# Patient Record
Sex: MALE | Race: BLACK OR AFRICAN AMERICAN | NOT HISPANIC OR LATINO | Employment: FULL TIME | URBAN - METROPOLITAN AREA
[De-identification: names, ages, dates, MRNs, and addresses within clinical notes are randomized per-mention and may not be internally consistent; named-entity substitution may affect disease eponyms.]

---

## 2023-05-04 ENCOUNTER — OFFICE VISIT (OUTPATIENT)
Dept: PRIMARY CARE | Facility: CLINIC | Age: 33
End: 2023-05-04
Payer: COMMERCIAL

## 2023-05-04 VITALS
HEIGHT: 72 IN | SYSTOLIC BLOOD PRESSURE: 133 MMHG | WEIGHT: 216 LBS | DIASTOLIC BLOOD PRESSURE: 75 MMHG | HEART RATE: 90 BPM | BODY MASS INDEX: 29.26 KG/M2

## 2023-05-04 DIAGNOSIS — M16.10 ARTHRITIS, HIP: ICD-10-CM

## 2023-05-04 DIAGNOSIS — B95.8 STAPH SKIN INFECTION: ICD-10-CM

## 2023-05-04 DIAGNOSIS — R74.01 TRANSAMINITIS: Primary | ICD-10-CM

## 2023-05-04 DIAGNOSIS — L08.9 STAPH SKIN INFECTION: ICD-10-CM

## 2023-05-04 DIAGNOSIS — I31.39 PERICARDIAL EFFUSION (HHS-HCC): ICD-10-CM

## 2023-05-04 DIAGNOSIS — K86.89 FATTY PANCREAS (HHS-HCC): ICD-10-CM

## 2023-05-04 LAB
C REACTIVE PROTEIN (MG/L) IN SER/PLAS: 0.51 MG/DL
ERYTHROCYTE DISTRIBUTION WIDTH (RATIO) BY AUTOMATED COUNT: 13.9 % (ref 11.5–14.5)
ERYTHROCYTE MEAN CORPUSCULAR HEMOGLOBIN CONCENTRATION (G/DL) BY AUTOMATED: 30.9 G/DL (ref 32–36)
ERYTHROCYTE MEAN CORPUSCULAR VOLUME (FL) BY AUTOMATED COUNT: 89 FL (ref 80–100)
ERYTHROCYTES (10*6/UL) IN BLOOD BY AUTOMATED COUNT: 4.42 X10E12/L (ref 4.5–5.9)
ESTIMATED AVERAGE GLUCOSE FOR HBA1C: 111 MG/DL
HEMATOCRIT (%) IN BLOOD BY AUTOMATED COUNT: 39.2 % (ref 41–52)
HEMOGLOBIN (G/DL) IN BLOOD: 12.1 G/DL (ref 13.5–17.5)
HEMOGLOBIN A1C/HEMOGLOBIN TOTAL IN BLOOD: 5.5 %
LEUKOCYTES (10*3/UL) IN BLOOD BY AUTOMATED COUNT: 7.2 X10E9/L (ref 4.4–11.3)
NRBC (PER 100 WBCS) BY AUTOMATED COUNT: 0 /100 WBC (ref 0–0)
PLATELETS (10*3/UL) IN BLOOD AUTOMATED COUNT: 390 X10E9/L (ref 150–450)

## 2023-05-04 PROCEDURE — 99204 OFFICE O/P NEW MOD 45 MIN: CPT | Performed by: INTERNAL MEDICINE

## 2023-05-04 PROCEDURE — 82150 ASSAY OF AMYLASE: CPT

## 2023-05-04 PROCEDURE — 80061 LIPID PANEL: CPT

## 2023-05-04 PROCEDURE — 85027 COMPLETE CBC AUTOMATED: CPT

## 2023-05-04 PROCEDURE — 83690 ASSAY OF LIPASE: CPT

## 2023-05-04 PROCEDURE — 80076 HEPATIC FUNCTION PANEL: CPT

## 2023-05-04 PROCEDURE — 86140 C-REACTIVE PROTEIN: CPT

## 2023-05-04 PROCEDURE — 83036 HEMOGLOBIN GLYCOSYLATED A1C: CPT

## 2023-05-04 RX ORDER — SULFAMETHOXAZOLE AND TRIMETHOPRIM 800; 160 MG/1; MG/1
1 TABLET ORAL 2 TIMES DAILY
COMMUNITY
Start: 2023-04-26

## 2023-05-04 RX ORDER — CEPHALEXIN 500 MG/1
500 CAPSULE ORAL EVERY 6 HOURS
COMMUNITY
Start: 2023-04-26

## 2023-05-04 ASSESSMENT — PATIENT HEALTH QUESTIONNAIRE - PHQ9
1. LITTLE INTEREST OR PLEASURE IN DOING THINGS: NOT AT ALL
SUM OF ALL RESPONSES TO PHQ9 QUESTIONS 1 AND 2: 0
2. FEELING DOWN, DEPRESSED OR HOPELESS: NOT AT ALL

## 2023-05-04 NOTE — ASSESSMENT & PLAN NOTE
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  We will recheck the liver functions at this time.  Patient denies excessive use of alcohol.  Recommend regular exercise.  Check amylase lipase and A1c  Follow-up 1 month

## 2023-05-04 NOTE — ASSESSMENT & PLAN NOTE
Unclear etiology but given the recent diagnosis of staph infection would be concerned for infection.  Clinically patient appears non-ill however will repeat the CBC to ensure the white count downtrends  Check CRP and order stat echocardiogram given pericardial effusion and pericardial lymph node.  No audible murmurs on physical findings

## 2023-05-04 NOTE — ASSESSMENT & PLAN NOTE
Given evidence of severe osteoarthritis in an individual who is under the age of 35 will refer to orthopedist for evaluation

## 2023-05-04 NOTE — ASSESSMENT & PLAN NOTE
Clinically the skin infection appears to be improving and resolving.  Continue the antibiotics to completion

## 2023-05-04 NOTE — PROGRESS NOTES
Subjective   Patient ID: Christiano Gasca is a 32 y.o. male who presents for New Patient Visit (Nov/Follow up to the E.R).    HPI     Patient is a 32-year-old male who presents as follow-up from recent ER visit.  Patient has no significant past medical history.  He went to the emergency room due to an infection of his left upper extremity after having a tattoo placed.  He was diagnosed with a staph infection of the arm and was placed on both Keflex and Bactrim.  He has completed the Bactrim but he still on the Keflex.  For the most part the infection has cleared.  Of note during the ER visit his white count was noted to be 16 and he had an elevated liver function panel as well as an alk phos.  A CT scan of the abdomen and pelvis did not show any evidence of biliary disease however it did show fatty liver as well as fatty pancreas with concerns for changes that might suggest either diabetes or pancreatic insufficiency.  Patient denies any floating stools or excessive diarrhea.  He denies history of pancreatitis or diabetes.    The CT chest showed evidence of a pericardial effusion as well as a pericardial lymph node.  It is unclear what the clinical significance is.  Clinically patient is improving and he denies any fevers or fatigue.  No swelling of the legs and no shortness of breath.    Patient also complaining of right knee and hip pain.  He states this been going on for a while.  The CT scan in the ER showed severe arthritis of the right hip which patient was not aware of.  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        Review of Systems  Constitutional: No fever or chills  Cardiovascular: no chest pain, no palpitations and no syncope.   Respiratory: no cough, no shortness of breath during exertion and no shortness of breath at rest.   Gastrointestinal: no abdominal pain, no nausea and no vomiting.  Neuro: No Headache, no dizziness    Objective   /75   Pulse 90    "Ht 1.816 m (5' 11.5\")   Wt 98 kg (216 lb)   BMI 29.71 kg/m²     Physical Exam  Constitutional: Alert and in no acute distress. Well developed, well nourished  Head and Face: Head and face: Normal.    Cardiovascular: Heart rate and rhythm were normal, normal S1 and S2. No peripheral edema.   Pulmonary: No respiratory distress. Clear bilateral breath sounds.  Musculoskeletal: Gait and station: Normal. Muscle strength/tone: Normal.   Skin: Normal skin color and pigmentation, normal skin turgor, and no rash.    Psychiatric: Judgment and insight: Intact. Mood and affect: Normal.        Lab Results   Component Value Date    WBC 16.8 (H) 04/26/2023    HGB 14.4 04/26/2023    HCT 42.2 04/26/2023     04/26/2023     (H) 04/26/2023    AST 42 (H) 04/26/2023     04/26/2023    K 3.2 (L) 04/26/2023    CL 99 04/26/2023    CREATININE 0.76 04/26/2023    BUN 17 04/26/2023    CO2 30 04/26/2023       CT abdomen pelvis w IV contrast  Narrative: Interpreted By:  EUNICE GAMEZ MD and MARLON NORIEGA MD  MRN: [id number]  Patient Name: SALVADOR HEMPHILL     STUDY:  CT ABDOMEN AND PELVIS W IV CONTRAST;  4/26/2023 4:06 pm     INDICATION:  fever, Lie Flat: Yes .     COMPARISON:  None.     ACCESSION NUMBER(S):  [id number]     ORDERING CLINICIAN:  ARMAAN DIETRICH     TECHNIQUE:  CT of the abdomen and pelvis was performed.  Standard contiguous  axial images were obtained at 3 mm slice thickness through the  abdomen and pelvis. Coronal and sagittal reconstructions at 3 mm  slice thickness were performed.     85 ml of contrast Omnipaque 350 were administered intravenously  without immediate complication.     FINDINGS:  LOWER CHEST:  The visualized lung base is unremarkable. The heart is normal in size  with trace pericardial effusion and mild pericardial enhancement best  visualized on series 301, image 1) there is a small pericardial lymph  node noted on series 301, image 22). No pleural effusion is present.  Visualized distal " esophagus appears normal.     ABDOMEN:     LIVER:  The liver is normal in size. There is a 1.1 cm hypodense,  hypoattenuating liver lesion visualized on series 301, image 47 which  is too small to characterize.     BILE DUCTS:  The intrahepatic and extrahepatic ducts are not dilated.     GALLBLADDER:  No calcified stones. No wall thickening.     PANCREAS:  There is fatty replacement of the pancreatic parenchyma with no  pancreatic ductal dilatation or masses.     SPLEEN:  The spleen is normal in size without focal lesions.     ADRENAL GLANDS:  Bilateral adrenal glands appear normal.     KIDNEYS AND URETERS:  The kidneys are normal in size and enhance symmetrically.  No  hydroureteronephrosis or nephroureterolithiasis is identified.     PELVIS:     BLADDER:  The urinary bladder appears normal without abnormal wall thickening.     REPRODUCTIVE ORGANS:  The prostate is not enlarged.     BOWEL:  The stomach is unremarkable.   The small and large bowel are normal  in caliber and demonstrate no wall thickening.   Normal appendix.     VESSELS:  There is no aneurysmal dilatation of the abdominal aorta. The IVC  appears normal.     PERITONEUM/RETROPERITONEUM/LYMPH NODES:  No ascites or free air, no fluid collection.  No enlarged mesenteric  lymph nodes.     BONES AND ABDOMINAL WALL:  There are multiple focal lucencies, and sclerosis involving the right  femoral head and acetabulum best visualized on coronal view series  303, image 53 through 67 with osteophytosis and narrowing of the  anterior superior aspect of the hip joint space likely to suggest  severe osteoarthrosis. Suspicious osseous lesions are identified.  The abdominal wall soft tissues appear normal.     Impression: 1.  Trace pericardial fluid and mild enhancement with a small  subcentimeter pericardial lymph node as detailed above. Findings may  represent pericarditis recommend correlation with clinical evaluation  an echocardiogram.  2. Fatty infiltration of  the pancreatic parenchyma with no pancreatic  ductal dilatation or masses. Findings to be correlated with history  of pancreatic insufficiency and diabetes mellitus.  3. Severe right hip osteoarthritis with narrowing of the anterior  superior hip joint space may be secondary to prior trauma or  femoroacetabular impingement.     I personally reviewed the images/study and I agree with the findings  as stated. This study was interpreted at [place], [place].  XR chest 2 view  Narrative: Interpreted By:  LETY AAVLOS MD and EDENILSON POLO MD  MRN: [id number]  Patient Name: SALVADOR HEMPHILL     STUDY:   CHEST 2 VIEW PA AND LAT;  4/26/2023 3:51 pm     INDICATION:  fever .     COMPARISON:  Chest x-ray 09/12/2021.     ACCESSION NUMBER(S):  [id number]     ORDERING CLINICIAN:  ARMAAN DIETRICH     FINDINGS:  PA and lateral radiographs of the chest were provided.     CARDIOMEDIASTINAL SILHOUETTE:  Cardiomediastinal silhouette is normal in size and configuration.     LUNGS:  No focal consolidation, sizeable pleural effusion or pneumothorax.     ABDOMEN:  No remarkable upper abdominal findings.     BONES:  No acute osseous changes.     Impression: 1.  No evidence of acute cardiopulmonary process.     I personally reviewed the images/study and I agree with the findings  as stated. This study was interpreted at [facility], [place], [place].            Assessment/Plan   Problem List Items Addressed This Visit          Circulatory    Pericardial effusion     Unclear etiology but given the recent diagnosis of staph infection would be concerned for infection.  Clinically patient appears non-ill however will repeat the CBC to ensure the white count downtrends  Check CRP and order stat echocardiogram given pericardial effusion and pericardial lymph node.  No audible murmurs on physical findings         Relevant Orders    Lipase    Amylase    Hemoglobin  A1C    Lipid Panel    Transthoracic Echo (TTE) Complete    C-reactive protein       Digestive    Fatty pancreas    Relevant Orders    Lipase    Amylase    Hemoglobin A1C    Lipid Panel    C-reactive protein       Musculoskeletal    Arthritis, hip     Given evidence of severe osteoarthritis in an individual who is under the age of 35 will refer to orthopedist for evaluation         Relevant Orders    Referral to Orthopaedic Surgery    Lipase    Amylase    Hemoglobin A1C    Lipid Panel    C-reactive protein       Infectious/Inflammatory    Staph skin infection     Clinically the skin infection appears to be improving and resolving.  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  We will recheck the liver functions at this time.  Patient denies excessive use of alcohol.  Recommend regular exercise.  Check amylase lipase and A1c  Follow-up 1 month         Relevant Orders    Hepatic function panel    CBC    Lipase    Amylase    Hemoglobin A1C    Lipid Panel    C-reactive protein

## 2023-05-05 LAB
ALANINE AMINOTRANSFERASE (SGPT) (U/L) IN SER/PLAS: 152 U/L (ref 10–52)
ALBUMIN (G/DL) IN SER/PLAS: 4.4 G/DL (ref 3.4–5)
ALKALINE PHOSPHATASE (U/L) IN SER/PLAS: 70 U/L (ref 33–120)
AMYLASE (U/L) IN SER/PLAS: 37 U/L (ref 29–103)
ASPARTATE AMINOTRANSFERASE (SGOT) (U/L) IN SER/PLAS: 57 U/L (ref 9–39)
BILIRUBIN DIRECT (MG/DL) IN SER/PLAS: 0.2 MG/DL (ref 0–0.3)
BILIRUBIN TOTAL (MG/DL) IN SER/PLAS: 0.6 MG/DL (ref 0–1.2)
CHOLESTEROL (MG/DL) IN SER/PLAS: 109 MG/DL (ref 0–199)
CHOLESTEROL IN HDL (MG/DL) IN SER/PLAS: 46.4 MG/DL
CHOLESTEROL/HDL RATIO: 2.3
LDL: 53 MG/DL (ref 0–99)
LIPASE (U/L) IN SER/PLAS: 30 U/L (ref 9–82)
PROTEIN TOTAL: 6.6 G/DL (ref 6.4–8.2)
TRIGLYCERIDE (MG/DL) IN SER/PLAS: 47 MG/DL (ref 0–149)
VLDL: 9 MG/DL (ref 0–40)

## 2023-05-17 DIAGNOSIS — R74.01 TRANSAMINITIS: Primary | ICD-10-CM

## 2023-10-06 RX ORDER — MELOXICAM 15 MG/1
15 TABLET ORAL DAILY
COMMUNITY
Start: 2023-05-16

## 2023-10-24 ENCOUNTER — APPOINTMENT (OUTPATIENT)
Dept: PHYSICAL THERAPY | Facility: CLINIC | Age: 33
End: 2023-10-24
Payer: COMMERCIAL

## 2024-05-02 ENCOUNTER — APPOINTMENT (OUTPATIENT)
Dept: RADIOLOGY | Facility: CLINIC | Age: 34
End: 2024-05-02
Payer: COMMERCIAL

## 2024-05-16 ENCOUNTER — APPOINTMENT (OUTPATIENT)
Dept: PRIMARY CARE | Facility: CLINIC | Age: 34
End: 2024-05-16
Payer: COMMERCIAL

## 2024-05-30 ENCOUNTER — APPOINTMENT (OUTPATIENT)
Dept: PRIMARY CARE | Facility: CLINIC | Age: 34
End: 2024-05-30
Payer: COMMERCIAL

## 2024-07-02 ENCOUNTER — APPOINTMENT (OUTPATIENT)
Dept: ORTHOPEDIC SURGERY | Facility: CLINIC | Age: 34
End: 2024-07-02
Payer: COMMERCIAL

## 2024-07-08 ENCOUNTER — APPOINTMENT (OUTPATIENT)
Dept: ORTHOPEDIC SURGERY | Facility: CLINIC | Age: 34
End: 2024-07-08
Payer: COMMERCIAL

## 2024-07-08 DIAGNOSIS — M25.561 RIGHT KNEE PAIN, UNSPECIFIED CHRONICITY: ICD-10-CM

## 2024-07-11 ENCOUNTER — APPOINTMENT (OUTPATIENT)
Dept: PRIMARY CARE | Facility: CLINIC | Age: 34
End: 2024-07-11
Payer: COMMERCIAL

## 2024-07-18 ENCOUNTER — APPOINTMENT (OUTPATIENT)
Dept: ORTHOPEDIC SURGERY | Facility: CLINIC | Age: 34
End: 2024-07-18
Payer: COMMERCIAL

## 2024-08-06 ENCOUNTER — OFFICE VISIT (OUTPATIENT)
Dept: ORTHOPEDIC SURGERY | Facility: CLINIC | Age: 34
End: 2024-08-06
Payer: COMMERCIAL

## 2024-08-06 ENCOUNTER — HOSPITAL ENCOUNTER (OUTPATIENT)
Dept: RADIOLOGY | Facility: CLINIC | Age: 34
Discharge: HOME | End: 2024-08-06
Payer: COMMERCIAL

## 2024-08-06 VITALS — WEIGHT: 216 LBS | BODY MASS INDEX: 30.24 KG/M2 | HEIGHT: 71 IN

## 2024-08-06 DIAGNOSIS — M25.561 RIGHT KNEE PAIN, UNSPECIFIED CHRONICITY: ICD-10-CM

## 2024-08-06 DIAGNOSIS — M16.10 HIP ARTHRITIS: Primary | ICD-10-CM

## 2024-08-06 PROCEDURE — 73562 X-RAY EXAM OF KNEE 3: CPT | Mod: RIGHT SIDE | Performed by: RADIOLOGY

## 2024-08-06 PROCEDURE — 3008F BODY MASS INDEX DOCD: CPT | Performed by: ORTHOPAEDIC SURGERY

## 2024-08-06 PROCEDURE — 99214 OFFICE O/P EST MOD 30 MIN: CPT | Mod: 57 | Performed by: ORTHOPAEDIC SURGERY

## 2024-08-06 PROCEDURE — 73562 X-RAY EXAM OF KNEE 3: CPT | Mod: RT

## 2024-08-06 PROCEDURE — 99214 OFFICE O/P EST MOD 30 MIN: CPT | Performed by: ORTHOPAEDIC SURGERY

## 2024-08-06 RX ORDER — SODIUM CHLORIDE, SODIUM LACTATE, POTASSIUM CHLORIDE, CALCIUM CHLORIDE 600; 310; 30; 20 MG/100ML; MG/100ML; MG/100ML; MG/100ML
100 INJECTION, SOLUTION INTRAVENOUS CONTINUOUS
OUTPATIENT
Start: 2024-08-06

## 2024-08-06 ASSESSMENT — PAIN DESCRIPTION - DESCRIPTORS: DESCRIPTORS: ACHING;THROBBING;SHARP

## 2024-08-06 ASSESSMENT — PAIN - FUNCTIONAL ASSESSMENT: PAIN_FUNCTIONAL_ASSESSMENT: 0-10

## 2024-08-06 ASSESSMENT — PAIN SCALES - GENERAL: PAINLEVEL_OUTOF10: 8

## 2024-08-06 NOTE — LETTER
August 6, 2024     Patient: Christiano Gasca   YOB: 1990   Date of Visit: 8/6/2024       To Whom It May Concern:    It is my medical opinion that Christiano Gasca  may return to work with the following restrictions: no jumping off truck .    If you have any questions or concerns, please don't hesitate to call. [phone].         Sincerely,      CUAUHTEMOC López MD    CC: No Recipients

## 2024-08-06 NOTE — LETTER
August 6, 2024     Patient: Christiano Gasca   YOB: 1990   Date of Visit: 8/6/2024       To Whom It May Concern:    It is my medical opinion that Christiano Gasca may return to light duty immediately with the following restrictions: no driving or jumping off truck  .    If you have any questions or concerns, please don't hesitate to call. [phone].         Sincerely,        MD Elise Jane LPN  CC: No Recipients

## 2024-08-06 NOTE — PROGRESS NOTES
Chief complaint is right hip pain he is struggling this week sometimes can even get out of bed in the morning he works on an armored car he has tried activity modification anti-inflammatories and physical therapy he suffered from the sequela of a childhood hip trauma sounds like a hip dislocation  He is also having some pain rating to the knee  Past medical,family and social histories have been reviewed and are up to date.  All other body systems have been reviewed and are negative for complaint.  Constitutional: Well-developed well-nourished   Eyes: Sclerae anicteric, pupils equal and round  HENT: Normocephalic atraumatic  Cardiovascular: Pulses full, regular rate and rhythm  Respiratory: Breathing not labored, no wheezing  Integumentary: Skin intact, no lesions or rashes  Neurological: Sensation intact, no gross strength deficits, reflexes equal  Psychiatric: Alert oriented and appropriate  Hematologic/lymphatic: No lymphadenopathy  Right hip 0 rotation very painful with any attempted manipulation  Right knee there is no effusion full range of motion no instability  X-rays of the hip show very advanced end-stage arthritis with cystic changes and severe joint space narrowing x-rays of the knee are negative assessment end-stage hip arthritis we discussed hip replacement in detail even though he is quite young to have this done he has such end-stage arthritis there is no other solution he understands to require revision surgery at some point in his life  This patient has failed nonoperative treatment for hip arthritis which has included activity modification attempts at weight loss physical therapy and oral anti-inflammatory medication.  We have discussed the potential risks of surgery including but not limited to leg length inequality, infection, DVT, neurovascular injury persistent pain, prosthetic loosening and periprosthetic fracture.  The patient wishes to proceed with surgery  Patient understands the  preoperative medical evaluation and joint replacement class will be necessary.  The patient also understands that the plan is this for her to be an outpatient procedure and that appropriate arrangements must be made for supervision and assistance at home.

## 2024-08-07 ENCOUNTER — TELEPHONE (OUTPATIENT)
Dept: ORTHOPEDIC SURGERY | Facility: CLINIC | Age: 34
End: 2024-08-07
Payer: COMMERCIAL

## 2024-08-08 DIAGNOSIS — Z01.818 PRE-OP TESTING: ICD-10-CM

## 2024-08-08 PROBLEM — M16.10 HIP ARTHRITIS: Status: ACTIVE | Noted: 2024-08-06

## 2024-08-15 ENCOUNTER — TELEPHONE (OUTPATIENT)
Dept: ORTHOPEDIC SURGERY | Facility: CLINIC | Age: 34
End: 2024-08-15
Payer: COMMERCIAL

## 2024-08-15 DIAGNOSIS — M16.10 HIP ARTHRITIS: Primary | ICD-10-CM

## 2024-08-15 RX ORDER — TRAMADOL HYDROCHLORIDE 50 MG/1
50 TABLET ORAL EVERY 8 HOURS PRN
Qty: 28 TABLET | Refills: 0 | Status: SHIPPED | OUTPATIENT
Start: 2024-08-15

## 2024-08-15 NOTE — TELEPHONE ENCOUNTER
Called patient to reschedule surgery as MD is unavailable 9/924. No answer vm left. Awaiting return call at this time.

## 2024-08-19 ENCOUNTER — CLINICAL SUPPORT (OUTPATIENT)
Dept: PREADMISSION TESTING | Facility: HOSPITAL | Age: 34
End: 2024-08-19
Payer: COMMERCIAL

## 2024-08-19 DIAGNOSIS — Z01.818 PRE-OP TESTING: ICD-10-CM

## 2024-08-19 RX ORDER — BISMUTH SUBSALICYLATE 262 MG
1 TABLET,CHEWABLE ORAL DAILY
COMMUNITY

## 2024-08-19 RX ORDER — IBUPROFEN 200 MG
400 TABLET ORAL EVERY 8 HOURS PRN
COMMUNITY

## 2024-08-27 ENCOUNTER — APPOINTMENT (OUTPATIENT)
Dept: PREADMISSION TESTING | Facility: HOSPITAL | Age: 34
End: 2024-08-27
Payer: COMMERCIAL

## 2024-08-29 ENCOUNTER — EDUCATION (OUTPATIENT)
Dept: ORTHOPEDIC SURGERY | Facility: CLINIC | Age: 34
End: 2024-08-29
Payer: COMMERCIAL

## 2024-08-29 NOTE — GROUP NOTE
In addition to the group class activities, Armand Gasca had the following lab work completed:  No orders of the defined types were placed in this encounter.    Christiano Gasca  attended joint class on 8/29 and Did not bring a care partner to the class. The preop survey was completed and the patient provided attestation that they understand the content reviewed and that they do have a care partner identified to assist with recovery. Patient was provided with a folder of materials and instructed to call orthopedic navigator with questions.   A new History and Physical was not completed.    This class lasted approximately 2 hours and had 11 participants. The nurse instructor covered the following topics:  Overview of joint replacement surgery.  Instructions for at-home preparation for surgery (included below).  Expectations before and after surgery including hospital stay.  Discharge planning and home care options.  Physical therapy overview and review of at-home exercises.    Thank you for attending our Joint Replacement class today in preparation for your upcoming surgery.  Topics discussed include:    MyChart Enrollment  Communication with Care Team  My Chart is the best form of communication to reach all of your caregivers  You can send messages to specific care givers, or a care team  Continued Education  You will be enrolled in a Total Joint Replacement care plan to receive additional education before and after surgery  You can review a short recording of the class content  Access to Medical Records  You can access test results, office notes, appointments, etc.  You can connect to other healthcare systems who use [employer] ([facility], [facility], [facility] etc[facility]  [email or web address]  Program Information  Using Meds to Beds is our standard process for joint replacements at [facility] to minimize issues with homegoing medications. Please let us know ahead of time if there is a reason why Meds to Beds cannot be  used    Background/Understanding of Joint Replacement Surgery  Potential for same day discharge  Any questions or concerns about your specific surgery/plan are to be directed to the surgeon's office    How to Prepare for Surgery  Use of Nicotine Products/Smoking  Stop several weeks before surgery  Such products slow down the healing process and increase risk of post-op infection and complications  Clearance for Surgery  Medical Clearance by Specialists  Dental Clearance  Cracked/Broken/Loose teeth left untreated may postpone surgery  The importance of post-op antibiotics for dental visits per surgeon protocol  Preadmission Testing  **Potential for postponed surgery if appropriate clearance is not obtained  Medication Instruction  Follow instructions provided by the doctor who prescribes your medication (typically, but not limited to cardiologist)  Preadmission testing will provide additional instructions during your appointment on what to stop and what to take as you get closer to surgery  For clarification of these instructions, please call preadmission testing directly - [phone]  Tips for Preparing the home for discharge from the hospital  Care Partner  Requirement for surgery, the patient must have a plan to have help at home  Potential for postponed surgery if plan for home support cannot be established  How the care partner can help after surgery  CHG Body Wash/Mouth Wash  Follow the instructions given at preadmission testing  Body wash is to be used on the body and hair for 5 washes  Mouthwash is to be used the night before and morning of surgery  **This is a system-wide protocol developed by infectious disease professionals, we will not alter our recommendations for those with sensitive skin or those who have special hair needs.  Please follow the instructions as they are written as this will provide the best infection prevention measures for surgery.  Should you have an allergy to one of the products,  please discuss with your preadmission team**    What to Expect in the Hospital/At Home  Morning of Surgery NPO Guidelines  Nothing to eat after midnight  Water can be consumed up to 2 hours prior to arrival  Surgical and Post-Surgical Care Team  Surgical Team  Anesthesia Team  Nursing  Physical Therapy  Care Coordinating  Pharmacy  Hospital Arrival Instructions  Arrive at the time provided to you  Consider traffic patterns (rush-hour) based on arrival time  Have arrangements made for a ride home  If discharging same day, care partner should remain at the hospital  Recovering after Surgery  Recovery Room - Visitors are not brought back  Transition to hospital room - 2nd Floor, Visitors will be directed to your room  The presence of and strategies for controlling surgical pain and swelling  The importance of early mobility  Side effects after surgery  What to expect if staying overnight    Discharge Planning  The intended plan for discharge will be for patients to discharge home  All patients require a care partner (family, friend, neighbor, etc.) to stay with the patient for the first few nights after surgery  The inability to secure help at home will postpone surgery  Home Care Services set up per surgeon order  Physical Therapy  Occupational Therapy  **If desired, private duty care can be arranged by the patient ahead of time**  Outpatient Physical Therapy per surgeon order    Recovering at Home  Wound Care  Follow wound care instructions found in your discharge paperwork  Bandage is water-resistant and you may shower with the bandage  Do not scrub directly over the bandage  Do not submerge in water until cleared (bathtub, hot tub, pool, etc.)    Post-Op Risk Prevention  Infection Prevention  Promptly seek treatment for any infections post-operatively  Routine dental visits must be postponed for 3 months after surgery  Your surgeon may require antibiotics prior to future dental visits  Any concerns for infection  not related directly to the knee or the hip should be managed by your primary care provider  Blood Clots  Be sure to complete the course of blood thinning medication as prescribed by your surgeon  Movement every 1-2 hours during the day is encouraged to prevent blood clots  Monitor for signs of blood clots  Wear compression stockings as prescribed by your surgeon  Constipation  Constipation is common following surgery  Drink plenty of fluids  Take stool softener/laxative as prescribed by your surgeon  Move around frequently  Eat foods high in fiber  Fall Prevention  Prepare home ahead of time to clear space to move with walker  Remove throw rugs and electrical cords from walkways  Install railings near any stairways with more than 2 steps  Use night lights for increased visibility at night  Continue to use your assistive device until cleared by surgeon or physical therapy  Dislocation Prevention - Not all procedures will have dislocation precautions  Follow dislocation precautions provided by your surgeon  It is OK to resume sexual activity about 6 weeks following surgery  Be sure to follow any dislocation precautions assigned    Durable Medical Equipment  Cold Therapy  Breg Cold Therapy Machines  Ice/Gel Packs  Assistive Devices  Folding Walker with Wheels (in the front only)  No Rollators  Crutches if approved by Physical Therapy and Surgeon after surgery  Hip Kits  Raised Toilet Seats  Additional Compression Stockings    Joint Preservation  Healthy Activities when Cleared  Walking  Swimming  Bike Riding  Activities to Avoid  Refrain from repetitive motions which have a high impact on the joint  Gradual Progression  Progress activity slowly, listen to your body  Common Findings - NORMAL after surgery  Clicking/Grinding  Numbness near incision    Physical Therapy  Prehabilitation exercises  START TODAY ON BOTH LEGS  Surgery Specific Precautions  Follow surgery specific precautions found in your discharge  paperwork    Follow-Up Visit  All patients will see their surgeon for a follow up visit after surgery  The visit may range from 2-6 weeks after surgery and is surgeon specific      Please don't hesitate to reach out if you have any additional questions or concerns.    Mehran Fischer BSN, RN  Cindy Moreira BSN, RN-BC  Orthopedic Nurses  [facility]   [phone]

## 2024-09-04 ENCOUNTER — PRE-ADMISSION TESTING (OUTPATIENT)
Dept: PREADMISSION TESTING | Facility: HOSPITAL | Age: 34
End: 2024-09-04
Payer: COMMERCIAL

## 2024-09-04 ENCOUNTER — HOSPITAL ENCOUNTER (OUTPATIENT)
Dept: RADIOLOGY | Facility: HOSPITAL | Age: 34
Discharge: HOME | End: 2024-09-04
Payer: COMMERCIAL

## 2024-09-04 ENCOUNTER — DOCUMENTATION (OUTPATIENT)
Dept: PREADMISSION TESTING | Facility: HOSPITAL | Age: 34
End: 2024-09-04

## 2024-09-04 ENCOUNTER — LAB (OUTPATIENT)
Dept: LAB | Facility: LAB | Age: 34
End: 2024-09-04
Payer: COMMERCIAL

## 2024-09-04 VITALS
OXYGEN SATURATION: 99 % | WEIGHT: 224.87 LBS | DIASTOLIC BLOOD PRESSURE: 84 MMHG | SYSTOLIC BLOOD PRESSURE: 133 MMHG | RESPIRATION RATE: 18 BRPM | HEIGHT: 73 IN | BODY MASS INDEX: 29.8 KG/M2 | TEMPERATURE: 98 F | HEART RATE: 66 BPM

## 2024-09-04 DIAGNOSIS — Z01.818 PREOP EXAMINATION: Primary | ICD-10-CM

## 2024-09-04 DIAGNOSIS — Z01.818 PREOP EXAMINATION: ICD-10-CM

## 2024-09-04 DIAGNOSIS — M16.10 HIP ARTHRITIS: ICD-10-CM

## 2024-09-04 LAB
ANION GAP SERPL CALC-SCNC: 14 MMOL/L (ref 10–20)
BASOPHILS # BLD AUTO: 0.03 X10*3/UL (ref 0–0.1)
BASOPHILS NFR BLD AUTO: 0.4 %
BUN SERPL-MCNC: 16 MG/DL (ref 6–23)
CALCIUM SERPL-MCNC: 9 MG/DL (ref 8.6–10.3)
CHLORIDE SERPL-SCNC: 103 MMOL/L (ref 98–107)
CO2 SERPL-SCNC: 27 MMOL/L (ref 21–32)
CREAT SERPL-MCNC: 0.76 MG/DL (ref 0.5–1.3)
EGFRCR SERPLBLD CKD-EPI 2021: >90 ML/MIN/1.73M*2
EOSINOPHIL # BLD AUTO: 0.08 X10*3/UL (ref 0–0.7)
EOSINOPHIL NFR BLD AUTO: 1.2 %
ERYTHROCYTE [DISTWIDTH] IN BLOOD BY AUTOMATED COUNT: 13 % (ref 11.5–14.5)
EST. AVERAGE GLUCOSE BLD GHB EST-MCNC: 108 MG/DL
GLUCOSE SERPL-MCNC: 77 MG/DL (ref 74–99)
HBA1C MFR BLD: 5.4 %
HCT VFR BLD AUTO: 39.8 % (ref 41–52)
HGB BLD-MCNC: 13.3 G/DL (ref 13.5–17.5)
IMM GRANULOCYTES # BLD AUTO: 0.03 X10*3/UL (ref 0–0.7)
IMM GRANULOCYTES NFR BLD AUTO: 0.4 % (ref 0–0.9)
LYMPHOCYTES # BLD AUTO: 2.61 X10*3/UL (ref 1.2–4.8)
LYMPHOCYTES NFR BLD AUTO: 38.8 %
MCH RBC QN AUTO: 28.7 PG (ref 26–34)
MCHC RBC AUTO-ENTMCNC: 33.4 G/DL (ref 32–36)
MCV RBC AUTO: 86 FL (ref 80–100)
MONOCYTES # BLD AUTO: 0.49 X10*3/UL (ref 0.1–1)
MONOCYTES NFR BLD AUTO: 7.3 %
NEUTROPHILS # BLD AUTO: 3.49 X10*3/UL (ref 1.2–7.7)
NEUTROPHILS NFR BLD AUTO: 51.9 %
NRBC BLD-RTO: 0 /100 WBCS (ref 0–0)
PLATELET # BLD AUTO: 231 X10*3/UL (ref 150–450)
POTASSIUM SERPL-SCNC: 4.1 MMOL/L (ref 3.5–5.3)
RBC # BLD AUTO: 4.64 X10*6/UL (ref 4.5–5.9)
SODIUM SERPL-SCNC: 140 MMOL/L (ref 136–145)
WBC # BLD AUTO: 6.7 X10*3/UL (ref 4.4–11.3)

## 2024-09-04 PROCEDURE — 36415 COLL VENOUS BLD VENIPUNCTURE: CPT

## 2024-09-04 PROCEDURE — 85025 COMPLETE CBC W/AUTO DIFF WBC: CPT

## 2024-09-04 PROCEDURE — 83036 HEMOGLOBIN GLYCOSYLATED A1C: CPT

## 2024-09-04 PROCEDURE — 99204 OFFICE O/P NEW MOD 45 MIN: CPT | Performed by: NURSE PRACTITIONER

## 2024-09-04 PROCEDURE — 87081 CULTURE SCREEN ONLY: CPT | Mod: AHULAB | Performed by: NURSE PRACTITIONER

## 2024-09-04 PROCEDURE — 73502 X-RAY EXAM HIP UNI 2-3 VIEWS: CPT | Mod: RIGHT SIDE | Performed by: RADIOLOGY

## 2024-09-04 PROCEDURE — 80048 BASIC METABOLIC PNL TOTAL CA: CPT

## 2024-09-04 PROCEDURE — 73502 X-RAY EXAM HIP UNI 2-3 VIEWS: CPT | Mod: RT

## 2024-09-04 RX ORDER — CHLORHEXIDINE GLUCONATE ORAL RINSE 1.2 MG/ML
15 SOLUTION DENTAL 2 TIMES DAILY
Qty: 473 ML | Refills: 0 | Status: SHIPPED | OUTPATIENT
Start: 2024-09-04

## 2024-09-04 ASSESSMENT — ENCOUNTER SYMPTOMS
NECK NEGATIVE: 1
EYES NEGATIVE: 1
ENDOCRINE NEGATIVE: 1
CONSTITUTIONAL NEGATIVE: 1
ARTHRALGIAS: 1
NEUROLOGICAL NEGATIVE: 1
RESPIRATORY NEGATIVE: 1
GASTROINTESTINAL NEGATIVE: 1

## 2024-09-04 NOTE — PREPROCEDURE INSTRUCTIONS
Medication List            Accurate as of September 4, 2024  1:37 PM. Always use your most recent med list.                chlorhexidine 0.12 % solution  Commonly known as: Peridex  Use 15 mL in the mouth or throat 2 times a day. Use night before surgery and morning of surgery Swish and spit  Medication Adjustments for Surgery: Take/Use as prescribed     ibuprofen 200 mg tablet  Additional Medication Adjustments for Surgery: Take last dose 7 days before surgery     multivitamin tablet  Additional Medication Adjustments for Surgery: Take last dose 7 days before surgery     traMADol 50 mg tablet  Commonly known as: Ultram  Take 1 tablet (50 mg) by mouth every 8 hours if needed for severe pain (7 - 10).  Medication Adjustments for Surgery: Take/Use as prescribed                      **Concerning above medication instructions, if medication is normally taken at night, continue normal schedule.**  **DO NOT TAKE NIGHT PRIOR AND MORNING OF SURGERY**    CONTACT SURGEON'S OFFICE IF YOU DEVELOP:  * Fever = 100.4 F   * New respiratory symptoms (e.g. cough, shortness of breath, respiratory distress, sore throat)  * Recent loss of taste or smell  *Flu like symptoms such as headache, fatigue or gastrointestinal symptoms  * You develop any open sores, shingles, burning or painful urination   AND/OR:  * You no longer wish to have the surgery.  * Any other personal circumstances change that may lead to the need to cancel or defer this surgery.  *You were admitted to any hospital within one week of your planned procedure.    SMOKING:  *Quitting smoking can make a huge difference to your health and recovery from surgery.    *If you need help with quitting, call 4-709-QUIT-NOW.    THE DAY BEFORE SURGERY:  *Do not eat any food after midnight the night before surgery.   *You are permitted to drink clear liquids (i.e. water, black coffee (no milk or cream), tea, apple juice and electrolyte drinks (gatorade)) up to 13.5 ounces, up to 2  hours before your arrival time.  *You may chew gum until 2 hours before your surgery    SURGICAL TIME  *You will be contacted between 2 p.m. and 6 p.m. the business day before your surgery with your arrival time.  *If you haven't received a call by 6pm, call [phone].  *Scheduled surgery times may change and you will be notified if this occurs-check your personal voicemail for any updates.    ON THE MORNING OF SURGERY:  *Wear comfortable, loose fitting clothing.   *Do not use moisturizers, creams, lotions or perfume.  *All jewelry and valuables should be left at home.  *Prosthetic devices such as contact lenses, hearing aids, dentures, eyelash extensions, hairpins and body piercing must be removed before surgery.    BRING WITH YOU:  *Photo ID and insurance card  *Current list of medicines and allergies  *Pacemaker/Defibrillator/Heart stent cards  *CPAP machine and mask  *Slings/splints/crutches  *Copy of your complete Advanced Directive/DHPOA-if applicable  *Neurostimulator implant remote    PARKING AND ARRIVAL:  *Check in at the Main Entrance desk and let them know you are here for surgery.  *You will be directed to the 2nd floor surgical waiting area.    AFTER OUTPATIENT SURGERY:  *A responsible adult MUST accompany you at the time of discharge and stay with you for 24 hours after your surgery.  *You may NOT drive yourself home after surgery.  *You may use a taxi or ride sharing service ([employer], [employer]) to return home ONLY if you are accompanied by a friend or family member.  *Instructions for resuming your medications will be provided by your surgeon.               Patient Information: Oral/Dental Rinse  **This is a prescription; pick it up at your preferred local pharmacy **  What is oral/dental rinse?   It is a mouthwash. It is a way of cleaning the mouth with a germ killing solution before your surgery.  The solution contains chlorhexidine, commonly known as CHG.   It is used inside the mouth to kill a  bacteria known as Staphylococcus aureus.  Let your doctor know if you are allergic to Chlorhexidine.    Why do I need to use CHG oral/dental rinse?  The CHG oral/dental rinse helps to kill a bacteria in your mouth known a Staphylococcus aureus.     This reduces the risk of infection at the surgical site.      Using your CHG oral/dental rinse  STEPS:  Use your CHG oral/dental rinse after you brush your teeth the night before (at bedtime) and the morning of your surgery.  Follow all directions on your prescription label.    Use the cap on the container to measure 15ml (fill cap to fill line)  Swish (gargle if you can) the mouthwash in your mouth for at least 30 seconds, (do not to swallow) spit out  After you use your CHG rinse, do not rinse your mouth with water, drink or eat.  Please refer to prescription label for the appropriate time to resume oral intake  Dental rinse comes in one size bottle: 473ml ~16oz.  You will have leftover    rinse, discard after this use.    What side effects might I have using the CHG oral/dental rinse?  CHG rinse will stick to plaque on the teeth.  Brush and floss just before use.  Teeth brushing will help avoid staining of plaque during use.    Who should I contact if I have questions about the CHG oral/dental rinse?  Please call [facility], Preadmission Testing at [phone] if you have any questions             Home Preoperative Antibacterial Shower     What is a home preoperative antibacterial shower?  This shower is a way of cleaning the skin with a germ killing soap before surgery.  The soap contains chlorhexidine, commonly known as CHG.  CHG is a soap for your skin with germ killing ability.  Let your doctor know if you are allergic to chlorhexidine.    Why do I need to take a preoperative antibacterial shower?  Skin is not sterile.  It is best to try to make your skin as free of germs as possible before surgery.  Proper cleansing with a germ  killing soap before surgery can lower the number of germs on your skin.  This helps to reduce the risk of infection at the surgical site.  Following the instructions listed below will help you prepare your skin for surgery.      How do I use the CHG skin cleanser?  Steps:  Begin using your CHG soap five days before your scheduled surgery on ________________________.    Days 1-4 Shower before bed:  Wash your face and genitals with your normal soap and rinse.    Wash and rinse your hair using the CHG soap. Rinse completely, do not condition your   Hair.          3.    Apply the CHG soap to a clean wet washcloth.  Turn the water off or move away                From the water spray to avoid premature rinsing of the CHG soap as you are applying.     4.   Lather your entire body from the neck down.  Do not use on your face or genitals.   Pay special attention to the area(s) where your incision(s) will be located unless they are on your face.  Avoid scrubbing your skin too hard.  The important point is to have the CHG soap sit on your skin for 3 minutes.    When the 3 minutes are up, turn on the water and rinse the CHG soap off your body completely.   Pat yourself dry with a clean, freshly-laundered towel.  Dress in clean, freshly laundered night clothes.    Be sure to sleep with clean, freshly laundered sheets.  Day 5:  Last shower is the morning before surgery: Follow above Instructions.    NOTE:    *Hair extensions should be removed    *Keep CHG soap out of eyes and ear canals   *DO NOT wash with regular soap on your body after you have used the CHG        soap solution  *DO NOT apply powders, lotions, or perfume.  *Deodorant may be used days 1-4, BUT NOT the day of surgery    Who should I contact if I have any questions regarding the use of CHG soap?  Call the [facility], Preadmission Testing at [phone] if you have any questions.              Patient Information: Pre-Operative  Infection Prevention Measures     Why did I have my nose, under my arms and groin swabbed?  The purpose of the swab is to identify Staphylococcus aureus inside your nose or on your skin.  The swab was sent to the laboratory for culture.  A positive swab/culture for Staphylococcus aureus is called colonization or carriage.      What is Staphylococcus aureus?  Staphylococcus aureus, also known as “staph”, is a germ found on the skin or in the nose of healthy people.  Sometimes Staphylococcus aureus can get into the body and cause an infection.  This can be minor (such as pimples, boils or other skin problems).  It might also be serious (such as blood infection, pneumonia or a surgical site infection).    What is Staphylococcus aureus colonization or carriage?  Colonization or carriage means that a person has the germ but is not sick from it.  These bacteria can be spread on the hands or when breathing or sneezing.    How is Staphylococcus aureus spread?  It is most often spread by close contact with a person or item that carries it.    What happens if my culture is positive for Staphylococcus aureus?  Your doctor/medical team will use this information to guide any antibiotic treatment which may be necessary.  Regardless of the culture results, we will clean the inside of your nose with a betadine swab just before you have your surgery.      Will I get an infection if I have Staphylococcus aureus in my nose or on my skin?  Anyone can get an infection with Staphylococcus aureus.  However, the best way to reduce your risk of infection is to follow the instructions provided to you for the use of your CHG soap and dental rinse.        Who should I contact if I have any questions?  Call the [facility], Preadmission Testing at [phone] if you have any questions.

## 2024-09-04 NOTE — CPM/PAT NURSE NOTE
"CPM/PAT Nurse Note      Name: Christiano Gasca (Christiano Gasca \"Armand\")  /Age: 1990/33 y.o.       Past Medical History:   Diagnosis Date    Osteoarthritis     Pericardial effusion ([facility])        No past surgical history on file.    Patient  has no history on file for sexual activity.    Family History   Problem Relation Name Age of Onset    Diabetes Mother      Arthritis Mother      Diabetes Father         No Known Allergies    Prior to Admission medications    Medication Sig Start Date End Date Taking? Authorizing Provider   chlorhexidine (Peridex) 0.12 % solution Use 15 mL in the mouth or throat 2 times a day. Use night before surgery and morning of surgery Swish and spit 24   Cindy Prasad, APRN-CNP   ibuprofen 200 mg tablet Take 2 tablets (400 mg) by mouth every 8 hours if needed for mild pain (1 - 3).    Historical Provider, MD   multivitamin tablet Take 1 tablet by mouth once daily.    Historical Provider, MD   traMADol (Ultram) 50 mg tablet Take 1 tablet (50 mg) by mouth every 8 hours if needed for severe pain (7 - 10). 8/15/24   Yazan Caruso MD        PAT ROS     DASI Risk Score    No data to display       Caprini DVT Assessment    No data to display       Modified Frailty Index    No data to display       CHADS2 Stroke Risk         N/A 3 to 100%: High Risk   2 to < 3%: Medium Risk   0 to < 2%: Low Risk     Last Change: N/A          This score determines the patient's risk of having a stroke if the patient has atrial fibrillation.        This score is not applicable to this patient. Components are not calculated.          Revised Cardiac Risk Index    No data to display       Apfel Simplified Score    No data to display       Risk Analysis Index Results This Encounter    No data found in the last 1 encounters.         Nurse Plan of Action: After Visit Summary (AVS) reviewed and patient verbalized good understanding of medications and NPO instructions.  Pre-op infection " prevention measures:  CHG showers and mouthwash reviewed, understanding voiced.  CHG soap given and patient verbalized need to pick CHG mouthwash at their preferred local pharmacy.

## 2024-09-04 NOTE — CPM/PAT NURSE NOTE
"CPM/PAT Nurse Note      Name: Christiano Gasca (Christiano Gasca \"Armand\")  /Age: 1990/33 y.o.       Past Medical History:   Diagnosis Date    Osteoarthritis     Pericardial effusion ([facility])        No past surgical history on file.    Patient  has no history on file for sexual activity.    Family History   Problem Relation Name Age of Onset    Diabetes Mother      Arthritis Mother      Diabetes Father         No Known Allergies    Prior to Admission medications    Medication Sig Start Date End Date Taking? Authorizing Provider   chlorhexidine (Peridex) 0.12 % solution Use 15 mL in the mouth or throat 2 times a day. Use night before surgery and morning of surgery Swish and spit 24   Cindy Prasad, APRN-CNP   ibuprofen 200 mg tablet Take 2 tablets (400 mg) by mouth every 8 hours if needed for mild pain (1 - 3).    Historical Provider, MD   multivitamin tablet Take 1 tablet by mouth once daily.    Historical Provider, MD   traMADol (Ultram) 50 mg tablet Take 1 tablet (50 mg) by mouth every 8 hours if needed for severe pain (7 - 10). 8/15/24   Yazan Caruso MD        PAT ROS     DASI Risk Score    No data to display       Caprini DVT Assessment    No data to display       Modified Frailty Index    No data to display       CHADS2 Stroke Risk         N/A 3 to 100%: High Risk   2 to < 3%: Medium Risk   0 to < 2%: Low Risk     Last Change: N/A          This score determines the patient's risk of having a stroke if the patient has atrial fibrillation.        This score is not applicable to this patient. Components are not calculated.          Revised Cardiac Risk Index    No data to display       Apfel Simplified Score    No data to display       Risk Analysis Index Results This Encounter    No data found in the last 1 encounters.         Nurse Plan of Action: After Visit Summary (AVS) reviewed and patient verbalized good understanding of medications and NPO instructions.             "

## 2024-09-04 NOTE — CPM/PAT H&P
"[facility]/PAT Evaluation       Name: Christiano Gasca (Christiano Gasca \"Armand\")  /Age: 1990/33 y.o.     In-Person           HPI        Date of Consult: 24    Referring Provider: Dr. Caruso    Surgery, Date, and Length: Right total hip arthroplasty, 24    Christiano Gasca is a 33 year-old male who presents to the [facility] for perioperative risk assessment prior to surgery.    Patient presents with a primary diagnosis of right hip pain.  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     This note was created in part upon personal review of patient's medical records.      Patient is scheduled to have right total hip arthroplasty      Pt denies any past history of anesthetic complications such as PONV, awareness, prolonged sedation, dental damage, aspiration, cardiac arrest, difficult intubation, difficult I.V. access or unexpected hospital admissions.  NO malignant hyperthermia and or pseudocholinesterase deficiency.  No history of blood transfusions     STOP BANG 2    The patient is not a [employer] and will accept blood and blood products if medically indicated.   Type and screen not sent.     Past Medical History:   Diagnosis Date    Osteoarthritis     Pericardial effusion ([facility]CC)       Past Surgical History:   Procedure Laterality Date    NO PAST SURGERIES        Social History     Tobacco Use    Smoking status: Every Day     Types: Cigarettes     Passive exposure: Current    Smokeless tobacco: Never   Substance Use Topics    Alcohol use: Not Currently    Drug use: Never      Family History   Problem Relation Name Age of Onset    Diabetes Mother      Arthritis Mother      Diabetes Father          No Known Allergies       Current " "Outpatient Medications:     traMADol (Ultram) 50 mg tablet, Take 1 tablet (50 mg) by mouth every 8 hours if needed for severe pain (7 - 10)., Disp: 28 tablet, Rfl: 0    chlorhexidine (Peridex) 0.12 % solution, Use 15 mL in the mouth or throat 2 times a day. Use night before surgery and morning of surgery Swish and spit, Disp: 473 mL, Rfl: 0    ibuprofen 200 mg tablet, Take 2 tablets (400 mg) by mouth every 8 hours if needed for mild pain (1 - 3)., Disp: , Rfl:     multivitamin tablet, Take 1 tablet by mouth once daily., Disp: , Rfl:      PAT ROS:   Constitutional:   neg    Neuro/Psych:   neg    Eyes:   neg    Ears:   neg    Nose:   neg    Mouth:   neg    Throat:   neg    Neck:   neg    Cardio:    Functional 4 mets. Denies sob walking from [facility] to parking lot  Respiratory:   neg    Endocrine:   neg    GI:   neg    :   neg    Musculoskeletal:    arthralgias (right hip)  Hematologic:   neg    Skin:  neg        Physical Exam  Vitals reviewed. Physical exam within normal limits.          PAT AIRWAY:   Airway:     Mallampati::  II    Neck ROM::  Full  normal        Heart Rate:  [66]   Temp:  [36.7 °C (98 °F)]   Resp:  [18]   BP: (133)/(84)   Height:  [184.2 cm (6' 0.5\")]   Weight:  [102 kg (224 lb 13.9 oz)]   SpO2:  [99 %]      Lab Results   Component Value Date    WBC 6.7 09/04/2024    HGB 13.3 (L) 09/04/2024    HCT 39.8 (L) 09/04/2024    MCV 86 09/04/2024     09/04/2024      Lab Results   Component Value Date    GLUCOSE 77 09/04/2024    CALCIUM 9.0 09/04/2024     09/04/2024    K 4.1 09/04/2024    CO2 27 09/04/2024     09/04/2024    BUN 16 09/04/2024    CREATININE 0.76 09/04/2024      Lab Results   Component Value Date    HGBA1C 5.4 09/04/2024        Assessment and Plan:         Patient is a 33-year-old male scheduled for a with Dr. Gallardo on 9/18/24 .  Patient has no active cardiac symptoms.   Patient denies any chest pain, tightness, heaviness, pressure, radiating pain, palpitations, irregular " heartbeats, lightheadedness, cough, congestion, shortness of breath, WHATLEY, PND, near syncope, weight loss or gain.     RCRI  1  , 6 % Risk of MACE        Hematology:  Patient instructed to ambulate as soon as possible postoperatively to decrease thromboembolic risk.   Initiate mechanical DVT prophylaxis as soon as possible and initiate chemical prophylaxis when deemed safe from a bleeding standpoint post surgery.     Caprini: 7    CBC, BMP, HGBA1C, MRSA ORDERED    Lab results reviewed and show mild anemia; A1c results reviewed and meet HR criteria to proceed with TJR as planned.     Risk assessment complete.  Patient is scheduled for a low surgical risk procedure.        Preoperative medication instructions were provided and reviewed with the patient.  Any additional testing or evaluation was explained to the patient.  Nothing by mouth instructions were discussed and patient's questions were answered prior to conclusion to this encounter.  Patient verbalized understanding of preoperative instructions given in preadmission testing; discharge instructions available in EMR.    This note was dictated by a speech recognition.  Minor errors may have been detected in a speech recognition.

## 2024-09-06 LAB — STAPHYLOCOCCUS SPEC CULT: NORMAL

## 2024-09-09 ENCOUNTER — APPOINTMENT (OUTPATIENT)
Dept: PRIMARY CARE | Facility: CLINIC | Age: 34
End: 2024-09-09
Payer: COMMERCIAL

## 2024-09-09 VITALS
WEIGHT: 228 LBS | HEART RATE: 83 BPM | OXYGEN SATURATION: 97 % | BODY MASS INDEX: 30.22 KG/M2 | SYSTOLIC BLOOD PRESSURE: 138 MMHG | DIASTOLIC BLOOD PRESSURE: 74 MMHG | HEIGHT: 73 IN

## 2024-09-09 DIAGNOSIS — R74.01 TRANSAMINITIS: ICD-10-CM

## 2024-09-09 DIAGNOSIS — Z00.00 HEALTH CARE MAINTENANCE: Primary | ICD-10-CM

## 2024-09-09 PROBLEM — I31.39 PERICARDIAL EFFUSION (HHS-HCC): Status: RESOLVED | Noted: 2023-05-04 | Resolved: 2024-09-09

## 2024-09-09 PROCEDURE — 3008F BODY MASS INDEX DOCD: CPT | Performed by: INTERNAL MEDICINE

## 2024-09-09 PROCEDURE — 4004F PT TOBACCO SCREEN RCVD TLK: CPT | Performed by: INTERNAL MEDICINE

## 2024-09-09 PROCEDURE — 99395 PREV VISIT EST AGE 18-39: CPT | Performed by: INTERNAL MEDICINE

## 2024-09-09 ASSESSMENT — PROMIS GLOBAL HEALTH SCALE
CARRYOUT_SOCIAL_ACTIVITIES: VERY GOOD
RATE_AVERAGE_PAIN: 8
RATE_GENERAL_HEALTH: EXCELLENT
RATE_MENTAL_HEALTH: EXCELLENT
RATE_AVERAGE_FATIGUE: MILD
EMOTIONAL_PROBLEMS: RARELY
RATE_SOCIAL_SATISFACTION: VERY GOOD
CARRYOUT_PHYSICAL_ACTIVITIES: MODERATELY
RATE_QUALITY_OF_LIFE: EXCELLENT
RATE_PHYSICAL_HEALTH: VERY GOOD

## 2024-09-09 NOTE — H&P (VIEW-ONLY)
"Subjective   Patient ID: Christiano Gasca \"Armand\" is a 33 y.o. male who presents for Annual Exam.          HPI     Patient is a 33-year-old male with past history of nicotine dependence who presents for wellness.  No specific complaints at this time.  He is undergoing hip replacement surgery next week.  He has not stopped smoking as of yet.  His blood pressure is a little bit on the elevated side but no history of hypertension and not currently on any medications.  He works a laborious job.  Has not filled out his FMLA or talk to HR as of yet.  He is currently on tramadol for his ongoing pain.  He tried ibuprofen but no significant relief.  He drinks alcohol occasionally.  No illicit substances.   Sexually active and monogamous.  No concern for STDs.    Review of Systems  Constitutional: No fever or chills, No Night Sweats  Eyes: No Blurry Vision or Eye sight problems  ENT: No Nasal Discharge, Hoarseness, sore throat  Cardiovascular: no chest pain, no palpitations and no syncope.   Respiratory: no cough, no shortness of breath during exertion and no shortness of breath at rest.   Gastrointestinal: no abdominal pain, no nausea and no vomiting.   : No issues with urinary stream, burning with urination, no blood in urine or stools  Skin: No Skin rashes or Lesions  Neuro: No Headache, no dizziness or Numbness or tingling  Psych: No Anxiety, depression or sleeping problems  Heme: No Easy bleeding or brusing.     Objective   /74   Pulse 83   Ht 1.854 m (6' 1\")   Wt 103 kg (228 lb)   SpO2 97%   BMI 30.08 kg/m²     Physical Exam  Constitutional: Alert and in no acute distress. Well developed, well nourished.   Head and Face: Head and face: Normal.    Eyes: Normal external exam. Pupils were equal in size, round, reactive to light (PERRL) with normal accommodation and extraocular movements intact (EOMI).   Ears, Nose, Mouth, and Throat: External inspection of ears and nose: Normal.  Hearing: Normal.  Nasal mucosa, " septum, and turbinates: Normal.  Lips, teeth, and gums: Normal.  Oropharynx: Normal.   Neck: No neck mass was observed. Supple. Thyroid not enlarged and there were no palpable thyroid nodules.   Cardiovascular: Heart rate and rhythm were normal, normal S1 and S2. Pedal pulses: Normal. No peripheral edema.   Pulmonary: No respiratory distress. Clear bilateral breath sounds.   Abdomen: Soft nontender; no abdominal mass palpated. Normal bowel sounds. No organomegaly.   : Deferred  Musculoskeletal: Right-sided hip pain extremities. Range of motion: Normal.  Muscle strength/tone: Normal.    Skin: Normal skin color and pigmentation, normal skin turgor, and no rash.   Neurologic: Deep tendon reflexes were 2+ and symmetric.   Psychiatric: Judgment and insight: Intact. Mood and affect: Normal.  Lymphatic: No cervical lymphadenopathy. Palpation of lymph nodes in axillae: Normal.  Palpation of lymph nodes in groin: Normal.    Lab Results   Component Value Date    WBC 6.7 09/04/2024    HGB 13.3 (L) 09/04/2024    HCT 39.8 (L) 09/04/2024     09/04/2024    CHOL 109 05/04/2023    TRIG 47 05/04/2023    HDL 46.4 05/04/2023     (H) 05/04/2023    AST 57 (H) 05/04/2023     09/04/2024    K 4.1 09/04/2024     09/04/2024    CREATININE 0.76 09/04/2024    BUN 16 09/04/2024    CO2 27 09/04/2024    HGBA1C 5.4 09/04/2024       XR hip right with pelvis when performed 2 or 3 views  Narrative: Interpreted By:  Paola Vyas,   STUDY:  Single view pelvis.  Right hip, two views.      INDICATION:  Signs/Symptoms:m16.11.      COMPARISON:  05/16/2023.      ACCESSION NUMBER(S):  [id number]      ORDERING CLINICIAN:  FARZANEH CULVER      FINDINGS:  No acute fracture or malalignment.  Severe right hip osteoarthrosis with joint space loss, subchondral  cysts, and osteophytes.      Mild left hip osteoarthrosis with osteophytes.      Soft tissues are within normal limits.      Impression: 1. Severe right and mild left hip  osteoarthrosis, unchanged.      MACRO:  None.      Signed by: Paola Vyas 9/7/2024 9:37 AM  Dictation workstation:   [id number]      Assessment/Plan   Problem List Items Addressed This Visit             ICD-10-CM    Transaminitis R74.01    Relevant Orders    Hepatic function panel     Other Visit Diagnoses         Codes    Health care maintenance    -  Primary Z00.00    Relevant Orders    Hepatic function panel          Given history of elevated liver enzymes we will repeat the hepatic function.  Encourage smoking cessation especially in the setting of upcoming major surgery.  Explained that smoking cessation can improve healing postoperatively.  Advised patient to reach out to HR to discuss FMLA and possible disability.  Labs completed recently and reviewed.    Dear Christiano Gasca     It was my pleasure to take care of you today in the office. Below are the things we discussed today:    1. Immunizations: Yearly Flu shot is recommended.       Deferring at this time    2. Blood Work: Up-to-date  3. Seen your dentist twice a year  4. Yearly Eye exam is recommended    5. BMI: 30.1  6: Diet recommendations:   Eat Clean, Try to have as many home cooked meals as possible  Avoid processed foods which contain excess calories, sugar, and sodium.    7. Exercise recommendations:   150 minutes a week to maintain your weight     If you have to loose weight, you need a better diet and exercise plan.     8. Please get your Living will / Advance directive completed if you do not have one already. Please make sure our office has a copy of the latest one.     9. Colonoscopy: Start at age 45  10. PSA: Start at age 45     11. Follow up annually as needed     Follow up in one year for a Physical. Please call the office before your Physical to see if you need blood work completed prior to your physical.     Please call me if any questions arise from now until your next visit. I will call you after I am done seeing patients. A  Doctor is always available by phone when the office is closed. Please feel free to call for help with any problem that you feel shouldn't wait until the office re-opens.     Jared Laurent, DO

## 2024-09-09 NOTE — PROGRESS NOTES
"Subjective   Patient ID: Christiano Gasca \"Armand\" is a 33 y.o. male who presents for Annual Exam.          HPI     Patient is a 33-year-old male with past history of nicotine dependence who presents for wellness.  No specific complaints at this time.  He is undergoing hip replacement surgery next week.  He has not stopped smoking as of yet.  His blood pressure is a little bit on the elevated side but no history of hypertension and not currently on any medications.  He works a laborious job.  Has not filled out his FMLA or talk to HR as of yet.  He is currently on tramadol for his ongoing pain.  He tried ibuprofen but no significant relief.  He drinks alcohol occasionally.  No illicit substances.   Sexually active and monogamous.  No concern for STDs.    Review of Systems  Constitutional: No fever or chills, No Night Sweats  Eyes: No Blurry Vision or Eye sight problems  ENT: No Nasal Discharge, Hoarseness, sore throat  Cardiovascular: no chest pain, no palpitations and no syncope.   Respiratory: no cough, no shortness of breath during exertion and no shortness of breath at rest.   Gastrointestinal: no abdominal pain, no nausea and no vomiting.   : No issues with urinary stream, burning with urination, no blood in urine or stools  Skin: No Skin rashes or Lesions  Neuro: No Headache, no dizziness or Numbness or tingling  Psych: No Anxiety, depression or sleeping problems  Heme: No Easy bleeding or brusing.     Objective   /74   Pulse 83   Ht 1.854 m (6' 1\")   Wt 103 kg (228 lb)   SpO2 97%   BMI 30.08 kg/m²     Physical Exam  Constitutional: Alert and in no acute distress. Well developed, well nourished.   Head and Face: Head and face: Normal.    Eyes: Normal external exam. Pupils were equal in size, round, reactive to light (PERRL) with normal accommodation and extraocular movements intact (EOMI).   Ears, Nose, Mouth, and Throat: External inspection of ears and nose: Normal.  Hearing: Normal.  Nasal mucosa, " septum, and turbinates: Normal.  Lips, teeth, and gums: Normal.  Oropharynx: Normal.   Neck: No neck mass was observed. Supple. Thyroid not enlarged and there were no palpable thyroid nodules.   Cardiovascular: Heart rate and rhythm were normal, normal S1 and S2. Pedal pulses: Normal. No peripheral edema.   Pulmonary: No respiratory distress. Clear bilateral breath sounds.   Abdomen: Soft nontender; no abdominal mass palpated. Normal bowel sounds. No organomegaly.   : Deferred  Musculoskeletal: Right-sided hip pain extremities. Range of motion: Normal.  Muscle strength/tone: Normal.    Skin: Normal skin color and pigmentation, normal skin turgor, and no rash.   Neurologic: Deep tendon reflexes were 2+ and symmetric.   Psychiatric: Judgment and insight: Intact. Mood and affect: Normal.  Lymphatic: No cervical lymphadenopathy. Palpation of lymph nodes in axillae: Normal.  Palpation of lymph nodes in groin: Normal.    Lab Results   Component Value Date    WBC 6.7 09/04/2024    HGB 13.3 (L) 09/04/2024    HCT 39.8 (L) 09/04/2024     09/04/2024    CHOL 109 05/04/2023    TRIG 47 05/04/2023    HDL 46.4 05/04/2023     (H) 05/04/2023    AST 57 (H) 05/04/2023     09/04/2024    K 4.1 09/04/2024     09/04/2024    CREATININE 0.76 09/04/2024    BUN 16 09/04/2024    CO2 27 09/04/2024    HGBA1C 5.4 09/04/2024       XR hip right with pelvis when performed 2 or 3 views  Narrative: Interpreted By:  Paola Vyas,   STUDY:  Single view pelvis.  Right hip, two views.      INDICATION:  Signs/Symptoms:m16.11.      COMPARISON:  05/16/2023.      ACCESSION NUMBER(S):  [id number]      ORDERING CLINICIAN:  FARZANEH CULVER      FINDINGS:  No acute fracture or malalignment.  Severe right hip osteoarthrosis with joint space loss, subchondral  cysts, and osteophytes.      Mild left hip osteoarthrosis with osteophytes.      Soft tissues are within normal limits.      Impression: 1. Severe right and mild left hip  osteoarthrosis, unchanged.      MACRO:  None.      Signed by: Paola Vyas 9/7/2024 9:37 AM  Dictation workstation:   [id number]      Assessment/Plan   Problem List Items Addressed This Visit             ICD-10-CM    Transaminitis R74.01    Relevant Orders    Hepatic function panel     Other Visit Diagnoses         Codes    Health care maintenance    -  Primary Z00.00    Relevant Orders    Hepatic function panel          Given history of elevated liver enzymes we will repeat the hepatic function.  Encourage smoking cessation especially in the setting of upcoming major surgery.  Explained that smoking cessation can improve healing postoperatively.  Advised patient to reach out to HR to discuss FMLA and possible disability.  Labs completed recently and reviewed.    Dear Christiano Gasca     It was my pleasure to take care of you today in the office. Below are the things we discussed today:    1. Immunizations: Yearly Flu shot is recommended.       Deferring at this time    2. Blood Work: Up-to-date  3. Seen your dentist twice a year  4. Yearly Eye exam is recommended    5. BMI: 30.1  6: Diet recommendations:   Eat Clean, Try to have as many home cooked meals as possible  Avoid processed foods which contain excess calories, sugar, and sodium.    7. Exercise recommendations:   150 minutes a week to maintain your weight     If you have to loose weight, you need a better diet and exercise plan.     8. Please get your Living will / Advance directive completed if you do not have one already. Please make sure our office has a copy of the latest one.     9. Colonoscopy: Start at age 45  10. PSA: Start at age 45     11. Follow up annually as needed     Follow up in one year for a Physical. Please call the office before your Physical to see if you need blood work completed prior to your physical.     Please call me if any questions arise from now until your next visit. I will call you after I am done seeing patients. A  Doctor is always available by phone when the office is closed. Please feel free to call for help with any problem that you feel shouldn't wait until the office re-opens.     Jared Laurent, DO

## 2024-09-11 ENCOUNTER — TELEPHONE (OUTPATIENT)
Dept: ORTHOPEDIC SURGERY | Facility: HOSPITAL | Age: 34
End: 2024-09-11
Payer: COMMERCIAL

## 2024-09-11 NOTE — TELEPHONE ENCOUNTER
Called patient to discuss upcoming surgery. Confirmed that the plan is for a same-day discharge. The patient has obtained their medical equipment. The patient does have a care partner to assist them at home postoperatively. They live in a house.  The patient does have stairs required for navigating the home. The patient currently does not use an assistive device. Reminded patient to follow PAT instructions leading up to surgery and to watch for a phone call from preop the day prior to their surgery to receive details about arrival time. All questions answered at this time.

## 2024-09-30 ENCOUNTER — TELEPHONE (OUTPATIENT)
Dept: ORTHOPEDIC SURGERY | Facility: HOSPITAL | Age: 34
End: 2024-09-30
Payer: COMMERCIAL

## 2024-10-06 ENCOUNTER — ANESTHESIA EVENT (OUTPATIENT)
Dept: OPERATING ROOM | Facility: HOSPITAL | Age: 34
End: 2024-10-06
Payer: COMMERCIAL

## 2024-10-07 ENCOUNTER — APPOINTMENT (OUTPATIENT)
Dept: RADIOLOGY | Facility: HOSPITAL | Age: 34
End: 2024-10-07
Payer: COMMERCIAL

## 2024-10-07 ENCOUNTER — HOSPITAL ENCOUNTER (OUTPATIENT)
Facility: HOSPITAL | Age: 34
Discharge: HOME HEALTH | End: 2024-10-08
Attending: ORTHOPAEDIC SURGERY | Admitting: ORTHOPAEDIC SURGERY
Payer: COMMERCIAL

## 2024-10-07 ENCOUNTER — HOME HEALTH ADMISSION (OUTPATIENT)
Dept: HOME HEALTH SERVICES | Facility: HOME HEALTH | Age: 34
End: 2024-10-07
Payer: COMMERCIAL

## 2024-10-07 ENCOUNTER — ANESTHESIA (OUTPATIENT)
Dept: OPERATING ROOM | Facility: HOSPITAL | Age: 34
End: 2024-10-07
Payer: COMMERCIAL

## 2024-10-07 DIAGNOSIS — M16.10 HIP ARTHRITIS: Primary | ICD-10-CM

## 2024-10-07 PROBLEM — E66.9 OBESITY: Status: ACTIVE | Noted: 2024-10-07

## 2024-10-07 PROCEDURE — 2720000007 HC OR 272 NO HCPCS: Performed by: ORTHOPAEDIC SURGERY

## 2024-10-07 PROCEDURE — 2500000001 HC RX 250 WO HCPCS SELF ADMINISTERED DRUGS (ALT 637 FOR MEDICARE OP): Performed by: ORTHOPAEDIC SURGERY

## 2024-10-07 PROCEDURE — 2500000005 HC RX 250 GENERAL PHARMACY W/O HCPCS: Performed by: ORTHOPAEDIC SURGERY

## 2024-10-07 PROCEDURE — 3600000018 HC OR TIME - INITIAL BASE CHARGE - PROCEDURE LEVEL SIX: Performed by: ORTHOPAEDIC SURGERY

## 2024-10-07 PROCEDURE — 2500000002 HC RX 250 W HCPCS SELF ADMINISTERED DRUGS (ALT 637 FOR MEDICARE OP, ALT 636 FOR OP/ED): Performed by: STUDENT IN AN ORGANIZED HEALTH CARE EDUCATION/TRAINING PROGRAM

## 2024-10-07 PROCEDURE — 72170 X-RAY EXAM OF PELVIS: CPT | Performed by: RADIOLOGY

## 2024-10-07 PROCEDURE — 7100000002 HC RECOVERY ROOM TIME - EACH INCREMENTAL 1 MINUTE: Performed by: ORTHOPAEDIC SURGERY

## 2024-10-07 PROCEDURE — 2500000001 HC RX 250 WO HCPCS SELF ADMINISTERED DRUGS (ALT 637 FOR MEDICARE OP): Performed by: ANESTHESIOLOGY

## 2024-10-07 PROCEDURE — RXMED WILLOW AMBULATORY MEDICATION CHARGE

## 2024-10-07 PROCEDURE — 2500000004 HC RX 250 GENERAL PHARMACY W/ HCPCS (ALT 636 FOR OP/ED): Performed by: STUDENT IN AN ORGANIZED HEALTH CARE EDUCATION/TRAINING PROGRAM

## 2024-10-07 PROCEDURE — 7100000001 HC RECOVERY ROOM TIME - INITIAL BASE CHARGE: Performed by: ORTHOPAEDIC SURGERY

## 2024-10-07 PROCEDURE — 27130 TOTAL HIP ARTHROPLASTY: CPT | Performed by: ORTHOPAEDIC SURGERY

## 2024-10-07 PROCEDURE — 2500000005 HC RX 250 GENERAL PHARMACY W/O HCPCS: Performed by: ANESTHESIOLOGY

## 2024-10-07 PROCEDURE — 3700000001 HC GENERAL ANESTHESIA TIME - INITIAL BASE CHARGE: Performed by: ORTHOPAEDIC SURGERY

## 2024-10-07 PROCEDURE — 2500000005 HC RX 250 GENERAL PHARMACY W/O HCPCS

## 2024-10-07 PROCEDURE — C1713 ANCHOR/SCREW BN/BN,TIS/BN: HCPCS | Performed by: ORTHOPAEDIC SURGERY

## 2024-10-07 PROCEDURE — 2500000004 HC RX 250 GENERAL PHARMACY W/ HCPCS (ALT 636 FOR OP/ED)

## 2024-10-07 PROCEDURE — 72170 X-RAY EXAM OF PELVIS: CPT

## 2024-10-07 PROCEDURE — C1776 JOINT DEVICE (IMPLANTABLE): HCPCS | Performed by: ORTHOPAEDIC SURGERY

## 2024-10-07 PROCEDURE — 2500000004 HC RX 250 GENERAL PHARMACY W/ HCPCS (ALT 636 FOR OP/ED): Performed by: ORTHOPAEDIC SURGERY

## 2024-10-07 PROCEDURE — 97110 THERAPEUTIC EXERCISES: CPT | Mod: GP

## 2024-10-07 PROCEDURE — 7100000011 HC EXTENDED STAY RECOVERY HOURLY - NURSING UNIT

## 2024-10-07 PROCEDURE — A27130 PR TOTAL HIP ARTHROPLASTY: Performed by: ANESTHESIOLOGY

## 2024-10-07 PROCEDURE — A27130 PR TOTAL HIP ARTHROPLASTY

## 2024-10-07 PROCEDURE — 2500000001 HC RX 250 WO HCPCS SELF ADMINISTERED DRUGS (ALT 637 FOR MEDICARE OP): Performed by: STUDENT IN AN ORGANIZED HEALTH CARE EDUCATION/TRAINING PROGRAM

## 2024-10-07 PROCEDURE — 97116 GAIT TRAINING THERAPY: CPT | Mod: GP

## 2024-10-07 PROCEDURE — 2500000004 HC RX 250 GENERAL PHARMACY W/ HCPCS (ALT 636 FOR OP/ED): Performed by: ANESTHESIOLOGY

## 2024-10-07 PROCEDURE — 3600000017 HC OR TIME - EACH INCREMENTAL 1 MINUTE - PROCEDURE LEVEL SIX: Performed by: ORTHOPAEDIC SURGERY

## 2024-10-07 PROCEDURE — 97530 THERAPEUTIC ACTIVITIES: CPT | Mod: GP

## 2024-10-07 PROCEDURE — 2780000003 HC OR 278 NO HCPCS: Performed by: ORTHOPAEDIC SURGERY

## 2024-10-07 PROCEDURE — 3700000002 HC GENERAL ANESTHESIA TIME - EACH INCREMENTAL 1 MINUTE: Performed by: ORTHOPAEDIC SURGERY

## 2024-10-07 PROCEDURE — 9420000001 HC RT PATIENT EDUCATION 5 MIN

## 2024-10-07 PROCEDURE — 97161 PT EVAL LOW COMPLEX 20 MIN: CPT | Mod: GP

## 2024-10-07 PROCEDURE — P9045 ALBUMIN (HUMAN), 5%, 250 ML: HCPCS | Mod: JZ

## 2024-10-07 DEVICE — IMPLANTABLE DEVICE: Type: IMPLANTABLE DEVICE | Site: HIP | Status: FUNCTIONAL

## 2024-10-07 DEVICE — ACETABULAR CUP, SECTOR, GRIPTON, SIZE 58MM: Type: IMPLANTABLE DEVICE | Site: HIP | Status: FUNCTIONAL

## 2024-10-07 DEVICE — SCREW CANCELLOUS 6.5 X 25: Type: IMPLANTABLE DEVICE | Site: HIP | Status: FUNCTIONAL

## 2024-10-07 DEVICE — FEMORAL HEAD, CERAMIC 36 +1.5: Type: IMPLANTABLE DEVICE | Site: HIP | Status: FUNCTIONAL

## 2024-10-07 RX ORDER — SODIUM CHLORIDE, SODIUM LACTATE, POTASSIUM CHLORIDE, CALCIUM CHLORIDE 600; 310; 30; 20 MG/100ML; MG/100ML; MG/100ML; MG/100ML
INJECTION, SOLUTION INTRAVENOUS CONTINUOUS PRN
Status: DISCONTINUED | OUTPATIENT
Start: 2024-10-07 | End: 2024-10-07

## 2024-10-07 RX ORDER — KETOROLAC TROMETHAMINE 30 MG/ML
15 INJECTION, SOLUTION INTRAMUSCULAR; INTRAVENOUS EVERY 6 HOURS
Status: COMPLETED | OUTPATIENT
Start: 2024-10-07 | End: 2024-10-08

## 2024-10-07 RX ORDER — ONDANSETRON HYDROCHLORIDE 2 MG/ML
4 INJECTION, SOLUTION INTRAVENOUS EVERY 8 HOURS PRN
Status: DISCONTINUED | OUTPATIENT
Start: 2024-10-07 | End: 2024-10-08 | Stop reason: HOSPADM

## 2024-10-07 RX ORDER — PREGABALIN 75 MG/1
75 CAPSULE ORAL ONCE
Status: COMPLETED | OUTPATIENT
Start: 2024-10-07 | End: 2024-10-07

## 2024-10-07 RX ORDER — ACETAMINOPHEN 500 MG
1000 TABLET ORAL EVERY 6 HOURS PRN
Qty: 240 TABLET | Refills: 0 | Status: SHIPPED | OUTPATIENT
Start: 2024-10-07 | End: 2024-11-07

## 2024-10-07 RX ORDER — TRANEXAMIC ACID 100 MG/ML
INJECTION, SOLUTION INTRAVENOUS AS NEEDED
Status: DISCONTINUED | OUTPATIENT
Start: 2024-10-07 | End: 2024-10-07

## 2024-10-07 RX ORDER — ACETAMINOPHEN 325 MG/1
650 TABLET ORAL EVERY 6 HOURS PRN
Status: DISCONTINUED | OUTPATIENT
Start: 2024-10-07 | End: 2024-10-08 | Stop reason: HOSPADM

## 2024-10-07 RX ORDER — TRANEXAMIC ACID 650 MG/1
1950 TABLET ORAL ONCE
Status: COMPLETED | OUTPATIENT
Start: 2024-10-07 | End: 2024-10-07

## 2024-10-07 RX ORDER — CYCLOBENZAPRINE HCL 5 MG
5 TABLET ORAL 3 TIMES DAILY PRN
Status: DISCONTINUED | OUTPATIENT
Start: 2024-10-07 | End: 2024-10-08 | Stop reason: HOSPADM

## 2024-10-07 RX ORDER — LIDOCAINE HYDROCHLORIDE 10 MG/ML
0.1 INJECTION, SOLUTION EPIDURAL; INFILTRATION; INTRACAUDAL; PERINEURAL ONCE
Status: DISCONTINUED | OUTPATIENT
Start: 2024-10-07 | End: 2024-10-07 | Stop reason: HOSPADM

## 2024-10-07 RX ORDER — DOCUSATE SODIUM 100 MG/1
100 CAPSULE, LIQUID FILLED ORAL 2 TIMES DAILY
Qty: 60 CAPSULE | Refills: 0 | Status: SHIPPED | OUTPATIENT
Start: 2024-10-07 | End: 2024-11-07

## 2024-10-07 RX ORDER — SCOLOPAMINE TRANSDERMAL SYSTEM 1 MG/1
1 PATCH, EXTENDED RELEASE TRANSDERMAL ONCE
Status: DISCONTINUED | OUTPATIENT
Start: 2024-10-07 | End: 2024-10-08 | Stop reason: HOSPADM

## 2024-10-07 RX ORDER — SODIUM CHLORIDE, SODIUM LACTATE, POTASSIUM CHLORIDE, CALCIUM CHLORIDE 600; 310; 30; 20 MG/100ML; MG/100ML; MG/100ML; MG/100ML
100 INJECTION, SOLUTION INTRAVENOUS CONTINUOUS
Status: DISCONTINUED | OUTPATIENT
Start: 2024-10-07 | End: 2024-10-07

## 2024-10-07 RX ORDER — CHLORPHENIRAMIN/PSEUDOEPHED/DM 1-15-5MG/5
17 LIQUID (ML) ORAL DAILY
Qty: 238 G | Refills: 0 | Status: SHIPPED | OUTPATIENT
Start: 2024-10-07

## 2024-10-07 RX ORDER — MELOXICAM 7.5 MG/1
7.5 TABLET ORAL ONCE
Status: COMPLETED | OUTPATIENT
Start: 2024-10-07 | End: 2024-10-07

## 2024-10-07 RX ORDER — CEFAZOLIN SODIUM 2 G/100ML
2 INJECTION, SOLUTION INTRAVENOUS EVERY 8 HOURS
Status: COMPLETED | OUTPATIENT
Start: 2024-10-07 | End: 2024-10-08

## 2024-10-07 RX ORDER — NALOXONE HYDROCHLORIDE 0.4 MG/ML
0.2 INJECTION, SOLUTION INTRAMUSCULAR; INTRAVENOUS; SUBCUTANEOUS EVERY 5 MIN PRN
Status: DISCONTINUED | OUTPATIENT
Start: 2024-10-07 | End: 2024-10-08 | Stop reason: HOSPADM

## 2024-10-07 RX ORDER — DROPERIDOL 2.5 MG/ML
0.62 INJECTION, SOLUTION INTRAMUSCULAR; INTRAVENOUS ONCE AS NEEDED
Status: DISCONTINUED | OUTPATIENT
Start: 2024-10-07 | End: 2024-10-07 | Stop reason: HOSPADM

## 2024-10-07 RX ORDER — FENTANYL CITRATE 50 UG/ML
INJECTION, SOLUTION INTRAMUSCULAR; INTRAVENOUS AS NEEDED
Status: DISCONTINUED | OUTPATIENT
Start: 2024-10-07 | End: 2024-10-07

## 2024-10-07 RX ORDER — ASPIRIN 81 MG/1
81 TABLET ORAL 2 TIMES DAILY
Qty: 60 TABLET | Refills: 0 | Status: SHIPPED | OUTPATIENT
Start: 2024-10-07 | End: 2024-11-07

## 2024-10-07 RX ORDER — OXYCODONE HYDROCHLORIDE 5 MG/1
5 TABLET ORAL EVERY 4 HOURS PRN
Status: DISCONTINUED | OUTPATIENT
Start: 2024-10-07 | End: 2024-10-08 | Stop reason: HOSPADM

## 2024-10-07 RX ORDER — HYDROMORPHONE HYDROCHLORIDE 1 MG/ML
1 INJECTION, SOLUTION INTRAMUSCULAR; INTRAVENOUS; SUBCUTANEOUS EVERY 2 HOUR PRN
Status: DISCONTINUED | OUTPATIENT
Start: 2024-10-07 | End: 2024-10-08 | Stop reason: HOSPADM

## 2024-10-07 RX ORDER — OXYCODONE HYDROCHLORIDE 5 MG/1
5 TABLET ORAL EVERY 4 HOURS PRN
Status: DISCONTINUED | OUTPATIENT
Start: 2024-10-07 | End: 2024-10-07 | Stop reason: HOSPADM

## 2024-10-07 RX ORDER — ACETAMINOPHEN 325 MG/1
650 TABLET ORAL EVERY 4 HOURS PRN
Status: DISCONTINUED | OUTPATIENT
Start: 2024-10-07 | End: 2024-10-07 | Stop reason: HOSPADM

## 2024-10-07 RX ORDER — WATER 1 ML/ML
IRRIGANT IRRIGATION AS NEEDED
Status: DISCONTINUED | OUTPATIENT
Start: 2024-10-07 | End: 2024-10-07 | Stop reason: HOSPADM

## 2024-10-07 RX ORDER — METOCLOPRAMIDE HYDROCHLORIDE 5 MG/ML
10 INJECTION INTRAMUSCULAR; INTRAVENOUS EVERY 6 HOURS PRN
Status: DISCONTINUED | OUTPATIENT
Start: 2024-10-07 | End: 2024-10-08 | Stop reason: HOSPADM

## 2024-10-07 RX ORDER — MIDAZOLAM HYDROCHLORIDE 1 MG/ML
INJECTION INTRAMUSCULAR; INTRAVENOUS AS NEEDED
Status: DISCONTINUED | OUTPATIENT
Start: 2024-10-07 | End: 2024-10-07

## 2024-10-07 RX ORDER — SODIUM CHLORIDE, SODIUM LACTATE, POTASSIUM CHLORIDE, CALCIUM CHLORIDE 600; 310; 30; 20 MG/100ML; MG/100ML; MG/100ML; MG/100ML
100 INJECTION, SOLUTION INTRAVENOUS CONTINUOUS
Status: DISCONTINUED | OUTPATIENT
Start: 2024-10-07 | End: 2024-10-07 | Stop reason: HOSPADM

## 2024-10-07 RX ORDER — BISACODYL 5 MG
10 TABLET, DELAYED RELEASE (ENTERIC COATED) ORAL DAILY PRN
Status: DISCONTINUED | OUTPATIENT
Start: 2024-10-07 | End: 2024-10-08 | Stop reason: HOSPADM

## 2024-10-07 RX ORDER — SODIUM CHLORIDE, SODIUM LACTATE, POTASSIUM CHLORIDE, CALCIUM CHLORIDE 600; 310; 30; 20 MG/100ML; MG/100ML; MG/100ML; MG/100ML
50 INJECTION, SOLUTION INTRAVENOUS CONTINUOUS
Status: DISCONTINUED | OUTPATIENT
Start: 2024-10-07 | End: 2024-10-08 | Stop reason: HOSPADM

## 2024-10-07 RX ORDER — ALBUTEROL SULFATE 0.83 MG/ML
2.5 SOLUTION RESPIRATORY (INHALATION) ONCE AS NEEDED
Status: DISCONTINUED | OUTPATIENT
Start: 2024-10-07 | End: 2024-10-07 | Stop reason: HOSPADM

## 2024-10-07 RX ORDER — SODIUM CHLORIDE 0.9 G/100ML
IRRIGANT IRRIGATION AS NEEDED
Status: DISCONTINUED | OUTPATIENT
Start: 2024-10-07 | End: 2024-10-07 | Stop reason: HOSPADM

## 2024-10-07 RX ORDER — SODIUM CHLORIDE, SODIUM LACTATE, POTASSIUM CHLORIDE, CALCIUM CHLORIDE 600; 310; 30; 20 MG/100ML; MG/100ML; MG/100ML; MG/100ML
20 INJECTION, SOLUTION INTRAVENOUS CONTINUOUS
Status: DISCONTINUED | OUTPATIENT
Start: 2024-10-07 | End: 2024-10-07

## 2024-10-07 RX ORDER — PHENYLEPHRINE HCL IN 0.9% NACL 1 MG/10 ML
SYRINGE (ML) INTRAVENOUS AS NEEDED
Status: DISCONTINUED | OUTPATIENT
Start: 2024-10-07 | End: 2024-10-07

## 2024-10-07 RX ORDER — POLYETHYLENE GLYCOL 3350 17 G/17G
17 POWDER, FOR SOLUTION ORAL DAILY
Status: DISCONTINUED | OUTPATIENT
Start: 2024-10-07 | End: 2024-10-08 | Stop reason: HOSPADM

## 2024-10-07 RX ORDER — HYDRALAZINE HYDROCHLORIDE 25 MG/1
25 TABLET, FILM COATED ORAL 3 TIMES DAILY PRN
Status: DISCONTINUED | OUTPATIENT
Start: 2024-10-07 | End: 2024-10-08 | Stop reason: HOSPADM

## 2024-10-07 RX ORDER — ONDANSETRON 4 MG/1
4 TABLET, FILM COATED ORAL EVERY 8 HOURS PRN
Status: DISCONTINUED | OUTPATIENT
Start: 2024-10-07 | End: 2024-10-08 | Stop reason: HOSPADM

## 2024-10-07 RX ORDER — OXYCODONE HYDROCHLORIDE 5 MG/1
5 TABLET ORAL EVERY 6 HOURS PRN
Qty: 28 TABLET | Refills: 0 | Status: SHIPPED | OUTPATIENT
Start: 2024-10-07 | End: 2024-10-15

## 2024-10-07 RX ORDER — PANTOPRAZOLE SODIUM 40 MG/1
40 TABLET, DELAYED RELEASE ORAL
Status: DISCONTINUED | OUTPATIENT
Start: 2024-10-08 | End: 2024-10-08 | Stop reason: HOSPADM

## 2024-10-07 RX ORDER — ASPIRIN 81 MG/1
81 TABLET ORAL 2 TIMES DAILY
Status: DISCONTINUED | OUTPATIENT
Start: 2024-10-08 | End: 2024-10-08 | Stop reason: HOSPADM

## 2024-10-07 RX ORDER — DOCUSATE SODIUM 100 MG/1
100 CAPSULE, LIQUID FILLED ORAL 2 TIMES DAILY
Status: DISCONTINUED | OUTPATIENT
Start: 2024-10-07 | End: 2024-10-08 | Stop reason: HOSPADM

## 2024-10-07 RX ORDER — KETOROLAC TROMETHAMINE 10 MG/1
10 TABLET, FILM COATED ORAL EVERY 6 HOURS PRN
Qty: 12 TABLET | Refills: 0 | Status: SHIPPED | OUTPATIENT
Start: 2024-10-07 | End: 2024-10-11

## 2024-10-07 RX ORDER — HYDROMORPHONE HYDROCHLORIDE 1 MG/ML
1 INJECTION, SOLUTION INTRAMUSCULAR; INTRAVENOUS; SUBCUTANEOUS ONCE
Status: COMPLETED | OUTPATIENT
Start: 2024-10-07 | End: 2024-10-07

## 2024-10-07 RX ORDER — ALBUMIN HUMAN 50 G/1000ML
SOLUTION INTRAVENOUS AS NEEDED
Status: DISCONTINUED | OUTPATIENT
Start: 2024-10-07 | End: 2024-10-07

## 2024-10-07 RX ORDER — OXYCODONE HYDROCHLORIDE 5 MG/1
10 TABLET ORAL EVERY 4 HOURS PRN
Status: DISCONTINUED | OUTPATIENT
Start: 2024-10-07 | End: 2024-10-08 | Stop reason: HOSPADM

## 2024-10-07 RX ORDER — ACETAMINOPHEN 325 MG/1
975 TABLET ORAL ONCE
Status: COMPLETED | OUTPATIENT
Start: 2024-10-07 | End: 2024-10-07

## 2024-10-07 RX ORDER — METHOCARBAMOL 100 MG/ML
1000 INJECTION, SOLUTION INTRAMUSCULAR; INTRAVENOUS ONCE
Status: COMPLETED | OUTPATIENT
Start: 2024-10-07 | End: 2024-10-07

## 2024-10-07 RX ORDER — BISACODYL 10 MG/1
10 SUPPOSITORY RECTAL DAILY PRN
Status: DISCONTINUED | OUTPATIENT
Start: 2024-10-07 | End: 2024-10-08 | Stop reason: HOSPADM

## 2024-10-07 RX ORDER — DIPHENHYDRAMINE HCL 12.5MG/5ML
12.5 LIQUID (ML) ORAL EVERY 6 HOURS PRN
Status: DISCONTINUED | OUTPATIENT
Start: 2024-10-07 | End: 2024-10-08 | Stop reason: HOSPADM

## 2024-10-07 RX ORDER — IPRATROPIUM BROMIDE 0.5 MG/2.5ML
500 SOLUTION RESPIRATORY (INHALATION) ONCE
Status: DISCONTINUED | OUTPATIENT
Start: 2024-10-07 | End: 2024-10-07 | Stop reason: HOSPADM

## 2024-10-07 RX ORDER — ONDANSETRON HYDROCHLORIDE 2 MG/ML
4 INJECTION, SOLUTION INTRAVENOUS ONCE AS NEEDED
Status: DISCONTINUED | OUTPATIENT
Start: 2024-10-07 | End: 2024-10-07 | Stop reason: HOSPADM

## 2024-10-07 RX ORDER — METOCLOPRAMIDE 10 MG/1
10 TABLET ORAL EVERY 6 HOURS PRN
Status: DISCONTINUED | OUTPATIENT
Start: 2024-10-07 | End: 2024-10-08 | Stop reason: HOSPADM

## 2024-10-07 RX ORDER — OXYCODONE HYDROCHLORIDE 5 MG/1
10 TABLET ORAL ONCE
Status: COMPLETED | OUTPATIENT
Start: 2024-10-07 | End: 2024-10-07

## 2024-10-07 RX ORDER — PANTOPRAZOLE SODIUM 40 MG/1
40 TABLET, DELAYED RELEASE ORAL DAILY
Qty: 30 TABLET | Refills: 0 | Status: SHIPPED | OUTPATIENT
Start: 2024-10-07 | End: 2024-11-07

## 2024-10-07 RX ORDER — PROPOFOL 10 MG/ML
INJECTION, EMULSION INTRAVENOUS AS NEEDED
Status: DISCONTINUED | OUTPATIENT
Start: 2024-10-07 | End: 2024-10-07

## 2024-10-07 RX ORDER — CEFAZOLIN 1 G/1
INJECTION, POWDER, FOR SOLUTION INTRAVENOUS AS NEEDED
Status: DISCONTINUED | OUTPATIENT
Start: 2024-10-07 | End: 2024-10-07

## 2024-10-07 SDOH — SOCIAL STABILITY: SOCIAL INSECURITY: WERE YOU ABLE TO COMPLETE ALL THE BEHAVIORAL HEALTH SCREENINGS?: YES

## 2024-10-07 SDOH — SOCIAL STABILITY: SOCIAL INSECURITY: DO YOU FEEL ANYONE HAS EXPLOITED OR TAKEN ADVANTAGE OF YOU FINANCIALLY OR OF YOUR PERSONAL PROPERTY?: NO

## 2024-10-07 SDOH — SOCIAL STABILITY: SOCIAL INSECURITY
WITHIN THE LAST YEAR, HAVE YOU BEEN KICKED, HIT, SLAPPED, OR OTHERWISE PHYSICALLY HURT BY YOUR PARTNER OR EX-PARTNER?: PATIENT DECLINED

## 2024-10-07 SDOH — SOCIAL STABILITY: SOCIAL INSECURITY: HAVE YOU HAD THOUGHTS OF HARMING ANYONE ELSE?: NO

## 2024-10-07 SDOH — SOCIAL STABILITY: SOCIAL INSECURITY: DOES ANYONE TRY TO KEEP YOU FROM HAVING/CONTACTING OTHER FRIENDS OR DOING THINGS OUTSIDE YOUR HOME?: NO

## 2024-10-07 SDOH — SOCIAL STABILITY: SOCIAL INSECURITY: ARE THERE ANY APPARENT SIGNS OF INJURIES/BEHAVIORS THAT COULD BE RELATED TO ABUSE/NEGLECT?: NO

## 2024-10-07 SDOH — SOCIAL STABILITY: SOCIAL INSECURITY
WITHIN THE LAST YEAR, HAVE YOU BEEN HUMILIATED OR EMOTIONALLY ABUSED IN OTHER WAYS BY YOUR PARTNER OR EX-PARTNER?: PATIENT DECLINED

## 2024-10-07 SDOH — ECONOMIC STABILITY: INCOME INSECURITY
IN THE PAST 12 MONTHS, HAS THE ELECTRIC, GAS, OIL, OR WATER COMPANY THREATENED TO SHUT OFF SERVICE IN YOUR HOME?: PATIENT DECLINED

## 2024-10-07 SDOH — SOCIAL STABILITY: SOCIAL INSECURITY: WITHIN THE LAST YEAR, HAVE YOU BEEN AFRAID OF YOUR PARTNER OR EX-PARTNER?: PATIENT DECLINED

## 2024-10-07 SDOH — SOCIAL STABILITY: SOCIAL INSECURITY: ARE YOU OR HAVE YOU BEEN THREATENED OR ABUSED PHYSICALLY, EMOTIONALLY, OR SEXUALLY BY ANYONE?: NO

## 2024-10-07 SDOH — SOCIAL STABILITY: SOCIAL INSECURITY: HAS ANYONE EVER THREATENED TO HURT YOUR FAMILY OR YOUR PETS?: NO

## 2024-10-07 SDOH — SOCIAL STABILITY: SOCIAL INSECURITY
WITHIN THE LAST YEAR, HAVE TO BEEN RAPED OR FORCED TO HAVE ANY KIND OF SEXUAL ACTIVITY BY YOUR PARTNER OR EX-PARTNER?: PATIENT DECLINED

## 2024-10-07 SDOH — SOCIAL STABILITY: SOCIAL INSECURITY: HAVE YOU HAD ANY THOUGHTS OF HARMING ANYONE ELSE?: NO

## 2024-10-07 SDOH — SOCIAL STABILITY: SOCIAL INSECURITY: ABUSE: ADULT

## 2024-10-07 SDOH — SOCIAL STABILITY: SOCIAL INSECURITY: DO YOU FEEL UNSAFE GOING BACK TO THE PLACE WHERE YOU ARE LIVING?: NO

## 2024-10-07 SDOH — ECONOMIC STABILITY: FOOD INSECURITY: WITHIN THE PAST 12 MONTHS, THE FOOD YOU BOUGHT JUST DIDN'T LAST AND YOU DIDN'T HAVE MONEY TO GET MORE.: PATIENT DECLINED

## 2024-10-07 SDOH — ECONOMIC STABILITY: FOOD INSECURITY: WITHIN THE PAST 12 MONTHS, YOU WORRIED THAT YOUR FOOD WOULD RUN OUT BEFORE YOU GOT MONEY TO BUY MORE.: PATIENT DECLINED

## 2024-10-07 ASSESSMENT — COGNITIVE AND FUNCTIONAL STATUS - GENERAL
HELP NEEDED FOR BATHING: A LITTLE
DRESSING REGULAR UPPER BODY CLOTHING: A LITTLE
PERSONAL GROOMING: A LITTLE
WALKING IN HOSPITAL ROOM: A LITTLE
MOVING TO AND FROM BED TO CHAIR: A LITTLE
MOVING FROM LYING ON BACK TO SITTING ON SIDE OF FLAT BED WITH BEDRAILS: A LITTLE
WALKING IN HOSPITAL ROOM: A LITTLE
TURNING FROM BACK TO SIDE WHILE IN FLAT BAD: A LITTLE
STANDING UP FROM CHAIR USING ARMS: A LITTLE
MOVING FROM LYING ON BACK TO SITTING ON SIDE OF FLAT BED WITH BEDRAILS: A LITTLE
MOVING TO AND FROM BED TO CHAIR: A LITTLE
PERSONAL GROOMING: A LITTLE
MOBILITY SCORE: 16
TOILETING: A LITTLE
PATIENT BASELINE BEDBOUND: NO
TURNING FROM BACK TO SIDE WHILE IN FLAT BAD: A LITTLE
MOVING TO AND FROM BED TO CHAIR: A LITTLE
WALKING IN HOSPITAL ROOM: A LITTLE
STANDING UP FROM CHAIR USING ARMS: A LITTLE
MOBILITY SCORE: 18
CLIMB 3 TO 5 STEPS WITH RAILING: A LITTLE
MOBILITY SCORE: 18
TURNING FROM BACK TO SIDE WHILE IN FLAT BAD: A LITTLE
DAILY ACTIVITIY SCORE: 19
DAILY ACTIVITIY SCORE: 19
DRESSING REGULAR LOWER BODY CLOTHING: A LITTLE
MOVING FROM LYING ON BACK TO SITTING ON SIDE OF FLAT BED WITH BEDRAILS: A LITTLE
DRESSING REGULAR UPPER BODY CLOTHING: A LITTLE
CLIMB 3 TO 5 STEPS WITH RAILING: A LITTLE
HELP NEEDED FOR BATHING: A LITTLE
CLIMB 3 TO 5 STEPS WITH RAILING: TOTAL
TOILETING: A LITTLE
STANDING UP FROM CHAIR USING ARMS: A LITTLE
DRESSING REGULAR LOWER BODY CLOTHING: A LITTLE

## 2024-10-07 ASSESSMENT — LIFESTYLE VARIABLES
HOW MANY STANDARD DRINKS CONTAINING ALCOHOL DO YOU HAVE ON A TYPICAL DAY: 1 OR 2
HOW OFTEN DO YOU HAVE A DRINK CONTAINING ALCOHOL: MONTHLY OR LESS
SKIP TO QUESTIONS 9-10: 1
AUDIT-C TOTAL SCORE: 1
AUDIT-C TOTAL SCORE: 1
HOW OFTEN DO YOU HAVE 6 OR MORE DRINKS ON ONE OCCASION: NEVER

## 2024-10-07 ASSESSMENT — PAIN - FUNCTIONAL ASSESSMENT

## 2024-10-07 ASSESSMENT — COLUMBIA-SUICIDE SEVERITY RATING SCALE - C-SSRS
6. HAVE YOU EVER DONE ANYTHING, STARTED TO DO ANYTHING, OR PREPARED TO DO ANYTHING TO END YOUR LIFE?: NO
1. IN THE PAST MONTH, HAVE YOU WISHED YOU WERE DEAD OR WISHED YOU COULD GO TO SLEEP AND NOT WAKE UP?: NO
6. HAVE YOU EVER DONE ANYTHING, STARTED TO DO ANYTHING, OR PREPARED TO DO ANYTHING TO END YOUR LIFE?: NO
1. IN THE PAST MONTH, HAVE YOU WISHED YOU WERE DEAD OR WISHED YOU COULD GO TO SLEEP AND NOT WAKE UP?: NO
2. HAVE YOU ACTUALLY HAD ANY THOUGHTS OF KILLING YOURSELF?: NO
2. HAVE YOU ACTUALLY HAD ANY THOUGHTS OF KILLING YOURSELF?: NO

## 2024-10-07 ASSESSMENT — PAIN SCALES - GENERAL
PAINLEVEL_OUTOF10: 10 - WORST POSSIBLE PAIN
PAINLEVEL_OUTOF10: 9
PAINLEVEL_OUTOF10: 10 - WORST POSSIBLE PAIN
PAINLEVEL_OUTOF10: 6
PAINLEVEL_OUTOF10: 10 - WORST POSSIBLE PAIN
PAINLEVEL_OUTOF10: 9
PAINLEVEL_OUTOF10: 10 - WORST POSSIBLE PAIN
PAINLEVEL_OUTOF10: 9
PAINLEVEL_OUTOF10: 10 - WORST POSSIBLE PAIN
PAINLEVEL_OUTOF10: 8
PAINLEVEL_OUTOF10: 10 - WORST POSSIBLE PAIN
PAINLEVEL_OUTOF10: 10 - WORST POSSIBLE PAIN
PAINLEVEL_OUTOF10: 8
PAINLEVEL_OUTOF10: 8
PAINLEVEL_OUTOF10: 10 - WORST POSSIBLE PAIN
PAINLEVEL_OUTOF10: 5 - MODERATE PAIN

## 2024-10-07 ASSESSMENT — PAIN DESCRIPTION - LOCATION
LOCATION: HIP

## 2024-10-07 ASSESSMENT — PAIN DESCRIPTION - DESCRIPTORS
DESCRIPTORS: ACHING

## 2024-10-07 ASSESSMENT — ACTIVITIES OF DAILY LIVING (ADL)
WALKS IN HOME: INDEPENDENT
HEARING - LEFT EAR: FUNCTIONAL
TOILETING: INDEPENDENT
BATHING: INDEPENDENT
HEARING - RIGHT EAR: FUNCTIONAL
DRESSING YOURSELF: INDEPENDENT
GROOMING: INDEPENDENT
LACK_OF_TRANSPORTATION: PATIENT DECLINED
FEEDING YOURSELF: INDEPENDENT
PATIENT'S MEMORY ADEQUATE TO SAFELY COMPLETE DAILY ACTIVITIES?: YES
ADEQUATE_TO_COMPLETE_ADL: YES
ASSISTIVE_DEVICE: WALKER
ADL_ASSISTANCE: INDEPENDENT
JUDGMENT_ADEQUATE_SAFELY_COMPLETE_DAILY_ACTIVITIES: YES

## 2024-10-07 ASSESSMENT — PAIN DESCRIPTION - ORIENTATION
ORIENTATION: RIGHT

## 2024-10-07 ASSESSMENT — PATIENT HEALTH QUESTIONNAIRE - PHQ9
2. FEELING DOWN, DEPRESSED OR HOPELESS: NOT AT ALL
1. LITTLE INTEREST OR PLEASURE IN DOING THINGS: NOT AT ALL
SUM OF ALL RESPONSES TO PHQ9 QUESTIONS 1 & 2: 0

## 2024-10-07 ASSESSMENT — PAIN SCALES - WONG BAKER: WONGBAKER_NUMERICALRESPONSE: HURTS EVEN MORE

## 2024-10-07 NOTE — ANESTHESIA POSTPROCEDURE EVALUATION
"Patient: Christiano Gasca \"Armand\"    Procedure Summary       Date: 10/07/24 Room / Location: [facility] OR 12 / [facility] OR    Anesthesia Start: 0947 Anesthesia Stop: 1306    Procedure: Right Hip Total Arthroplasty ~  Posterior Approach (Right: Hip) Diagnosis:       Hip arthritis      (Hip arthritis [M16.10])    Surgeons: Yazan Caruso MD Responsible Provider: Phil Lane MD    Anesthesia Type: spinal ASA Status: 2            Anesthesia Type: spinal    Vitals Value Taken Time   /110 10/07/24 1304   Temp 36.1 °C (97 °F) 10/07/24 1249   Pulse 70 10/07/24 1306   Resp 18 10/07/24 1306   SpO2 100 % 10/07/24 1305   Vitals shown include unfiled device data.    Anesthesia Post Evaluation    Patient location during evaluation: bedside  Patient participation: complete - patient participated  Level of consciousness: awake  Pain management: adequate  Airway patency: patent  Cardiovascular status: acceptable  Respiratory status: acceptable  Hydration status: acceptable  Postoperative Nausea and Vomiting: none        No notable events documented.    "

## 2024-10-07 NOTE — ANESTHESIA PREPROCEDURE EVALUATION
"Patient: Christiano Gasca \"Armand\"    Procedure Information       Date/Time: 10/07/24 0900    Procedure: Right Hip Total Arthroplasty ~  Posterior Approach (Right: Hip)    Location: [facility] A OR 12 / [facility] A OR    Surgeons: Yazan Caruso MD            Relevant Problems   Endocrine   (+) Obesity      ID   (+) Staph skin infection       Clinical information reviewed:   Tobacco  Allergies  Meds  Problems  Med Hx  Surg Hx   Fam Hx  Soc   Hx        NPO Detail:  NPO/Void Status  Carbohydrate Drink Given Prior to Surgery? : N  Date of Last Liquid: 10/07/24  Time of Last Liquid: 0030  Date of Last Solid: 10/06/24  Time of Last Solid: 2300  Last Intake Type: Clear fluids  Time of Last Void: 0600         Physical Exam    Airway  Mallampati: II     Cardiovascular   Rhythm: regular  Rate: normal     Dental    Pulmonary    Abdominal            Anesthesia Plan    History of general anesthesia?: yes  History of complications of general anesthesia?: no    ASA 2     spinal     intravenous induction   Anesthetic plan and risks discussed with patient.    Plan discussed with CRNA and CAA.      "

## 2024-10-07 NOTE — ANESTHESIA PROCEDURE NOTES
Spinal Block    Patient location during procedure: OR  Start time: 10/7/2024 9:51 AM  End time: 10/7/2024 9:58 AM  Reason for block: primary anesthetic and at surgeon's request  Staffing  Performed: [facility]SA   Authorized by: Phil Lane MD    Performed by: HAL Cha    Preanesthetic Checklist  Completed: patient identified, IV checked, site marked, risks and benefits discussed, surgical consent, monitors and equipment checked, pre-op evaluation, timeout performed and sterile techniques followed  Block Timeout  RN/Licensed healthcare professional reads aloud to the Anesthesia provider and entire team: Patient identity, procedure with side and site, patient position, and as applicable the availability of implants/special equipment/special requirements.  Patient on coagulant treatment: no  Timeout performed at: 10/7/2024 9:51 AM  Spinal Block  Patient position: sitting  Prep: Betadine  Sterility prep: mask, gloves, drape, cap and hand hygiene  Sedation level: light sedation  Patient monitoring: blood pressure, heart rate, continuous pulse oximetry, EKG and ETCO2  Approach: midline  Vertebral space: L3-4  Injection technique: single-shot  Needle  Needle type: Pencan.   Needle gauge: 24 G  Needle length: 4 in  Free flowing CSF: yes    Assessment  Sensory level: T10 bilateral  Block outcome: patient comfortable  Procedure assessment: patient sedated but conversant throughout procedure and patient tolerated procedure well with no immediate complications

## 2024-10-07 NOTE — PROGRESS NOTES
"Orthopaedic Surgery Progress Note    S:    Evaluated post-operatively. Pain controlled on current regimen.  Denies any new onset numbness, tingling or weakness. Denies nausea, vomiting, chest pain, dyspnea, or calf tenderness. Denies any fever or chills.     O:  /89 (BP Location: Right arm)   Pulse 80   Temp 36.3 °C (97.3 °F) (Temporal)   Resp 10   Ht 1.842 m (6' 0.5\")   Wt 102 kg (224 lb 13.9 oz)   SpO2 99%   BMI 30.08 kg/m²     GEN - NAD, resting comfortably in hospital bed  HEENT - MMM, EOMI, NCAT   CV - RRR by peripheral palpation, limbs wwp  PULM - NWOB   ABD - Non-distended  NEURO - SCHNEIDER spontaneously, CNs II - XII grossly intact  PSYCH - Appropriate mood and affect    MSK:  RLE:   -Post-operative dressing/splint in place without strikethrough bleeding.   -Motor intact in DF/PF/EHL/FHL, SILT in saph/sural/SPN/DPN/tibial distributions  -Foot wwp, brisk cap refill, 2+ DP pulse      A/P: 33M no PMH S/P R LUIZA with Dr. Caruso on 10/7.     -Clear liquid diet. Advance diet as tolerated  -Bowel regimen of colace, senna, miralax, and dulcolax PRN  -Tylenol, oxy 5/10, dilaudid PRN, IV toradol 15mg q6hr x 4 doses for pain management  -Continue LR@100; HLIV with good PO intake, BMP POD1  -PT/OT consult; WB status: WBAT RLE; Posterior hip precautions: do not cross leg over midline, do not bend at waist more than 90 degrees, do not rotate foot or leg inwards.  -Encourage IS   -Perioperative abx - ancef 24 hours,   -F/u CBC in AM, No indication for transfusion; 1x PO TXA on floor  -DVT ppx: SCDs + TEDs, ASA 81 bid   -Continue home medications - None  -Glycemic: No issues     Dispo: Pending PT, pain control; okay to dc from PACU if more alert and able to work with PT.     Plan was discussed with Dr. Caruso.     Orthopedic General Team (contact via epic chat):     Alfred Lindsay, PGY-3   Juventino Mcclendon, PGY-5     Please call on call orthopedic NP on nights after 6pm and weekends    "

## 2024-10-07 NOTE — DISCHARGE INSTRUCTIONS
Postoperative Instructions: Total Joint Replacement    POSTOPERATIVE MEDICATIONS  PAIN MEDICATION  Pain medications have been prescribed for post-operative pain control. Take in conjunction with ice/cold therapy to assist with swelling and pain. If prescribed multiple pain medications, be sure to alternate administration times throughout the day so that you can take something every few hours. Consider taking Tylenol in between narcotic administration times (keep in mind Percocet/Vicodin contain Tylenol and not to exceed Tylenol limit of 4grams in 24 hours). Prescription will generally be for 7 days at a time and refills will be sent upon request. For refills, request via [employer] or call surgeon's office during business hours and follow up with your pharmacy regarding status and pickup.    Side effects may be constipation and nausea, vomiting, sleepiness, dizziness, lightheadedness, headache, blurred vision, dry mouth, sweating, itching (if you have itching, over-the -counter Benadryl can be used as needed).  You may NOT operate a motor vehicle while taking narcotic pain medication.    BLOOD THINNER  Aspirin or another medication has been prescribed as a blood thinner to prevent blood clots in your leg or lungs. Take as prescribed on the bottle. You will not receive a refill on this medication.  Do not take this medication if you are on another blood thinner.  Do not take any anti-inflammatory medications such as Meloxicam, Celebrex, Ibuprofen, Motrin, Aleve, or Advil while using the Aspirin or another blood thinner unless instructed otherwise by your surgeon.       STOOL SOFTENERS/LAXATIVES  Post-operative constipation can result due to a combination of inactivity, anesthesia and pain medication. To help prevent this, you should increase your water and fiber intake. Physical activity, such as walking, will also help stimulate the bowels. If you are not having regular bowel movements, increase your bowel  "regimen!  Consider constipation prevention and treatment medications if not prescribed by your surgeon. These are available over the counter at the drug store (The pharmacist on staff may also make recommendations):  Stool Softener: Colace  Laxative: Senna, Miralax, Milk of Magnesia, Magnesium Citrate    WOUND CARE  Pain and swelling are normal following surgery and can last for weeks to months depending on the patient.  To help relieve these symptoms, please follow the post-operative pain regimen as it has been prescribed, use ice often, wear compression stockings every day as prescribed, and elevate your leg every hour.   You have a waterproof bandage on your wound and may shower with this on. The waterproof bandage is to remain in place for a minimum of 6 -7 days. Home care will remove it. You may leave your incision open to air after the bandage has been removed. Once the dressing is removed, you may see steri strips, surgical mesh, or glue. Do not peel or cut any of these items, they will fall off on their own or your surgeon will address at your follow up appointment.   DO NOT soak your incision in a bath, hot tub, pool or pond/lake for a minimum of 8 weeks following your surgery.  DO NOT use lotions, creams, ointments on your wound for a minimum of 6 weeks following your surgery. At that time you may use vitamin E to assist with softening of your incision.    ___X__ TOTAL HIP ARTHROPLASTY  After surgery, you will have a compression stocking on your operative leg. Continue to wear the compression stocking for 4 weeks to prevent blood clots in your leg or lungs. Remove compression stockings at night.  If there is continued drainage or bleeding, cover with an abdominal pad and tape.   posterior hip precautions:   Don't lean forward while you sit down or stand up, and don't bend past 90 degrees (like the angle in a letter \"L\"). This means you can't try to  something off the floor or bend down to tie your " shoes. Don't lift your knee higher than your hip.  Don't sit on low chairs, beds, or toilets. You may want to use a raised toilet seat for a while. Sit in chairs with arms. Imagine there's a line running down the middle of your body. Keep your legs from crossing over it.  When you get into a car, back up to the seat of the car, and then sit and slide across the seat toward the middle of the car with your knees about 12 inches apart. A plastic bag on the seat can help you slide in and out of the car.  Don't cross your legs when you sit.  Don't cross your ankles while lying down.  It may help to keep a pillow between your knees when you're in bed.    _____ TOTAL KNEE ARTHROPLASTY  After surgery, you will have a compression stocking and ACE wrap on your operative leg. Continue to wear the compression stocking for 4 weeks to prevent blood clots in your leg or lungs. Remove compression stockings at night. ACE wrap can be removed on postop day 1 or removed by homecare therapist at their first visit. Can use ACE wrap intermittently for swelling.  Elevate your leg periodically to help with swelling. BE SURE to place the support under your ankle, NOT under your knee. You want your knee as straight as possible.    JOINT CARE TEAM  For nursing or wound care questions within the first 6 weeks after surgery, please contact the joint replacement nurse at the facility where you had surgery.  If you are leaving a message, please include your full name, date of birth and date of surgery so that we can identify correctly identify you.  For messages left outside of normal business hours, your call will be returned on the next business day.  Please do not leave emergent messages outside of normal business hours that cannot wait until the next business day.  For orthopedic concerns longer than 6 weeks after your surgery, you will need to call the office to schedule an appointment to be seen.    [facility]:  Mehran Fischer RN,  [phone] or Cindy Moreira RN, [phone]        RESTARTING HOME MEDICATIONS/DIET  You may restart your home medications the following day after your surgery UNLESS you have been given alternate instructions.  Follow the instructions given to you on your hospital discharge instructions for more information regarding your home medications.  Resume your normal diet after surgery. If you are on a specific type of diet for your condition, resume that instead.  Choose foods that help promote good bowel habits and prevent constipation, such as foods high in fiber. Be sure to drink water to stay hydrated and to prevent constipation.       DENTAL PROCEDURES & CLEANINGS  All patients must wait a minimum of 3 months for elective dental appointments, including routine cleanings, as to prevent total joint replacement infections. Please refer to your surgeon as to whether you will need antibiotics for future dental appointments.     EMERGENCIES  When to contact our office immediately:  Fever >101.5 for at least 48 hours after surgery or chills.  Excessive bleeding from incision(s). A small amount of drainage is normal and expected.  Signs of infection of incision(s)-excessive drainage that is soaking through your dressing (especially if it is pus-like), redness that is spreading out from the edges of your incision, or increased warmth around the area.  Excruciating pain for which the pain medication, taken as instructed, is not helping.  Severe calf pain.  Go directly to the emergency room or call 911, if you are experiencing chest pain, shortness of breath, or difficulty breathing.    IN-HOME PHYSICAL THERAPY & OUTPATIENT PHYSICAL THERAPY  In-home physical therapy will start 1-2 days after you get home from the hospital.  The home care agency will call you prior to their first visit.  Please refer to the contact information on your hospital discharge instructions if you need to contact them.  Make sure to provide a  phone number with the ability for the home care staff to leave a message if you do not answer your phone.  Please continue to complete the assigned home exercises two times per day on the days that physical therapy is not scheduled to come to your home.    Following a total knee replacement, you should plan to transition from in-home therapy to outpatient therapy 2-3 weeks after surgery.  Patients should be making their first appointment several weeks in advance to avoid delays. You may use the physical therapy location of your choice; it is the patient's responsibility to make sure the location chosen is covered by insurance.  If you are having a hip replacement and need additional therapy, please contact the office for an order.    It is common to have a temporary increase in pain and swelling upon starting outpatient physical therapy and/or changing your exercise routine.  Continue to use ice to help with symptoms.    DRIVING & TRAVEL AFTER SURGERY   Patients should anticipate waiting at least 4-6 weeks before traveling long distances after surgery.  You will need to stop to walk around ever 1 hour during your travel to help with blood clot prevention.  Please call the office or your joint nurse to discuss prior to post-surgical travel.  Patients may not drive until cleared by the joint nurse or the office.    FOLLOW-UP APPOINTMENT  Please call your surgeon's office within 2 weeks following surgery to schedule a follow up visit.    ICE  You have been prescribed to ice your total joint at a minimum of twice per hour for 20 minutes while awake during the first 6 weeks after surgery if you are using ice packs. This will help with pain control. Be sure to have a layer of protection between the ice pack and your skin. Ice packs should be rotated on for 20 minutes and off for 20 minutes to prevent frost bite.   If you are using an ice machine, please follow ice machine instructions and tips below.    [facility] Cold  Therapy Machine Recommendations    Cold therapy devices can be used before and after surgery to assist in comfort and help to reduce pain and swelling.  These devices differ from ice or ice packs whereas the mechanism circulates water through tubing and a pad to provide longer periods of cold therapy to the desired site.  While in the hospital, you can use your cold devices around the clock for optimal comfort.  We recommend using cold therapy after working with therapy or completing exercises on your own.  Once you are discharged home, there is no set schedule in which you must follow while using cold therapy.  Below are a few points to remember when using a cold therapy device:    Read the 's instructions prior to first the use.  Follow instructions for filling the cooler (water first, then ice).  Always make sure there is a layer of protection between the cold pad and your skin (Clothing, Towel, Ace Bandage, etc.)  Allow the device to circulate cold water throughout the pad prior wrapping the pad around your leg (approximately 10 minutes).  Place the pad on your leg in the desired position to meet your pain management needs and use the wraps provided to secure the pad to your body.  The purpose of this device is to use consistently throughout the day.  You do not need to need to use the 20 on, 20 off method.  During waking hours, remove the cold pad every 1-2 hours to perform a skin check  Detach the pad from the cooler and ambulate at least once every hour  After removing the pad, allow at least 30 minutes before resuming cold therapy  You may wear the cold therapy device during periods of sleep including overnight    If you wake up during the night, you can check the skin at this time.  You do not need to wake up specifically to perform skin checks.  Empty the cooler and pad when device is not in use.  Follow 's instructions for cleaning your cold therapy device.

## 2024-10-07 NOTE — DISCHARGE SUMMARY
Discharge Summary    Discharge Diagnosis  Right Total Hip Arthroplasty    Issues Requiring Follow-Up  Home care services to start within 48 hours. Outpatient PT to start 2 weeks  S/P total Joint for Knees only. Hips optional.    Test Results Pending At Discharge  Pending Labs       No current pending labs.          Hospital Course  Patient underwent Right Total Hip Arthroplasty on 10/7 without complications. The patient was then taken to the PACU in stable condition. Patient was then transferred to the [facility].  Pain was appropriately controlled. Diet was advanced as tolerated. Patient progressed adequately through their recovery during hospital stay including PT/ OT and were recommended for discharge. Patient was then discharged on  to home in stable condition. Patient had uneventful hospital course. Patient was instructed on the use of pain medications as needed for pain. The signs and symptoms of infection were discussed and the patient was given our number to call should they have any questions or concerns following discharge.    Based on my clinical judgment, the patient was provided with a 7-day prescription for opioid medication at 30 MED, indicated for treatment of acute pain in the setting of recent Total Joint Arthroplasty. OARRS report was run and has demonstrated an appropriate time course.  The patient has been provided with counseling pertaining to safe use of opioid medication.    Patient may use operative extremity WBAT with use of walker for assistance with ambulation .  Mepilex dressing to be removed POD # 7 by home care and incision left open to air  OAC for DVT prophylaxis started on POD #1 and to be taken for 30 days    Patient is to follow-up in 2 weeks at scheduled post-op visit.     Face-to Face after surgery progress note  Pertinent Physical Exam At Time of Discharge  Review of Systems   Constitutional: Negative.  Negative for activity change, chills, fatigue and fever.    HENT: Negative.     Eyes: Negative.    Respiratory: Negative.  Negative for cough, chest tightness, shortness of breath and wheezing.    Cardiovascular: Negative.  Negative for palpitations.   Gastrointestinal:  Negative for abdominal pain, blood in stool, nausea and vomiting.   Endocrine: Negative.  Negative for cold intolerance and polyuria.   Genitourinary: Negative.  Negative for difficulty urinating, dysuria, frequency, hematuria and urgency.   Musculoskeletal:  Positive for gait problem and joint swelling. Negative for arthralgias and back pain.   Skin: Negative.  Negative for color change, pallor, rash and wound.   Allergic/Immunologic: Negative.  Negative for environmental allergies.   Neurological:  Negative for dizziness, weakness and light-headedness.   Hematological: Negative.    Psychiatric/Behavioral:  Negative for agitation, confusion and suicidal ideas. The patient is not nervous/anxious.    All other systems reviewed and are negative    Physical Exam  side: right leg  Vitals and nursing note reviewed. VSS, Afebrile  Constitutional:       Appearance: Normal appearance, awake and alert.  HENT:      Head: Normocephalic and atraumatic.       Pupils: Pupils are equal, round, and reactive to light.   Cardiovascular:      Rate and Rhythm: Normal rate and regular rhythm.   Pulmonary:      Effort: Pulmonary effort is normal.     Abdominal:         Palpations: Abdomen is soft.   Musculoskeletal:   Sensation intact bilaterally, sural/saph/sp/tibal n.  Motor intact flexion/extension/DF/PF/EHL/FHL bilaterally. Palpable symmetric DP/PT pulse bilaterally. Spinal wearing off.    Skin:      Bulky Dressing intact to the surgical extremity. No signs of gross bloody or purulent drainage.     General: Skin is warm and dry.      Capillary Refill: Capillary refill takes less than 2 seconds.   Neurological:      General: No focal deficit present.      Mental Status: She is alert and oriented to person, place, and time.  Mental status is at baseline.   Psychiatric:         Mood and Affect: Mood normal.        Home Medications  Scheduled medications    Current Facility-Administered Medications:     lactated Ringer's infusion, 100 mL/hr, intravenous, Continuous, Yazan Caruso MD, Last Rate: 100 mL/hr at 10/07/24 0733, 100 mL/hr at 10/07/24 0733    lactated Ringer's infusion, 20 mL/hr, intravenous, Continuous, Phil Lane MD    sodium chloride 0.9 % irrigation solution, , , PRN, Yazan Caruso MD, 3,000 mL at 10/07/24 1030    sterile water irrigation solution, , , PRN, Yazan Caruso MD, 1,000 mL at 10/07/24 1031    Facility-Administered Medications Ordered in Other Encounters:     albumin human 5 % infusion, , intravenous, PRN, Lubomyr Vantsa, CAA, 250 mL at 10/07/24 1030    ceFAZolin (Ancef) injection, , intravenous, PRN, Lubomyr Vantsa, CAA, 2 g at 10/07/24 1010    fentaNYL PF (Sublimaze) injection, , intravenous, PRN, Lubomyr Vantsa, CAA, 100 mcg at 10/07/24 0950    glycopyrrolate PF (Robinul) 0.2 mg/mL injection, , intravenous, PRN, Lubomyr Vantsa, CAA, 0.1 mg at 10/07/24 1013    lactated Ringer's infusion, , intravenous, Continuous PRN, Lubomyr Vantsa, CAA, New Bag at 10/07/24 0947    mepivacaine (Carbocaine) 15 mg/mL (1.5 %) injection, , intrathecal, PRN, Lubomyr Vantsa, CAA, 5 mL at 10/07/24 0958    midazolam (Versed) injection, , intravenous, PRN, Lubomyr Vantsa, CAA, 2 mg at 10/07/24 0947    phenylephrine in NS (Wilder-Synephrine) 100 mcg/mL syringe, , intravenous, PRN, Lubomyr Vantsa, CAA, 100 mcg at 10/07/24 1122    propofol (Diprivan) injection, , intravenous, PRN, Lubomyr Vantsa, CAA, 20 mg at 10/07/24 1226    tranexamic acid (Cyklokapron) injection, , intravenous, PRN, HAL Cha, 1,000 mg at 10/07/24 1010     PRN medications  PRN medications: sodium chloride, sterile water    Discharge medications     Your medication list        START taking these medications        Instructions Last Dose Given Next  Dose Due   acetaminophen 500 mg tablet  Commonly known as: Tylenol Extra Strength      Take 2 tablets (1,000 mg) by mouth every 6 hours if needed for mild pain (1 - 3).       aspirin 81 mg EC tablet      Take 1 tablet (81 mg) by mouth 2 times a day.       docusate sodium 100 mg capsule  Commonly known as: Colace      Take 1 capsule (100 mg) by mouth 2 times a day.       ketorolac 10 mg tablet  Commonly known as: Toradol      Take 1 tablet (10 mg) by mouth every 6 hours if needed for mild pain (1 - 3) for up to 3 days.       oxyCODONE 5 mg immediate release tablet  Commonly known as: Roxicodone      Take 1 tablet (5 mg) by mouth every 6 hours if needed for severe pain (7 - 10) for up to 7 days.       pantoprazole 40 mg EC tablet  Commonly known as: ProtoNix      Take 1 tablet (40 mg) by mouth once daily. Do not crush, chew, or split.       polyethylene glycol 17 gram packet  Commonly known as: Glycolax, Miralax      Take 17 g by mouth once daily. Mix 1 cap (17g) into 8 ounces of fluid.              CONTINUE taking these medications        Instructions Last Dose Given Next Dose Due   multivitamin tablet                  STOP taking these medications      chlorhexidine 0.12 % solution  Commonly known as: Peridex        ibuprofen 200 mg tablet        traMADol 50 mg tablet  Commonly known as: Ultram                  Where to Get Your Medications        These medications were sent to [facility]  [address], [facility] [address] [address]      Hours: 8 AM to 6 PM Mon-Fri, 9 AM to 1 PM Saturday Phone: [phone]   acetaminophen 500 mg tablet  aspirin 81 mg EC tablet  docusate sodium 100 mg capsule  ketorolac 10 mg tablet  oxyCODONE 5 mg immediate release tablet  pantoprazole 40 mg EC tablet  polyethylene glycol 17 gram packet         You have not been prescribed any medications.     Outpatient Follow-Up  Patient to follow-up with Dr. Caruso.  Thank you for trusting us with your care. You should be  scheduled for a follow-up post-surgical visit in 6 weeks.    Special Instructions  None    Please read discharge instructions provided by your surgeon before calling with questions as this will delay care.    Medication refills-Oxycodone will be refilled every 7 days per state law. Request refills through [facility]igator at the institution in which you had surgery or MyChart. All medication requests may take up to 72 hours to refill and refills after Friday 1pm will be refilled on the next business day.      No future appointments.

## 2024-10-07 NOTE — CARE PLAN
The patient's goals for the shift include pain control    The clinical goals for the shift include safety    Over the shift, the patient did not make progress toward the following goals. Barriers to progression include post surgical pain. Recommendations to address these barriers include pain management.

## 2024-10-07 NOTE — PROGRESS NOTES
Physical Therapy    Physical Therapy Evaluation & Treatment    Patient Name: Armand Gasca  MRN: [id number]  Department: [place] [id number]  Room: [facility]73[facility]  Today's Date: 10/7/2024   Time Calculation  Start Time: 1620  Stop Time: 1711  Time Calculation (min): 51 min    Assessment/Plan   PT Assessment  PT Assessment Results: Decreased strength, Decreased mobility, Pain, Orthopedic restrictions, Impaired balance  Rehab Prognosis: Excellent  Evaluation/Treatment Tolerance: Patient tolerated treatment well  Medical Staff Made Aware: Yes  Strengths: Ability to acquire knowledge, Access to adaptive/assistive products, Attitude of self, Coping skills, Premorbid level of function, Support and attitude of living partners  Barriers to Participation: Comorbidities, Housing layout  End of Session Communication: Bedside nurse  Assessment Comment: Patient is a 33 y.o. male admitted for elective R LUIZA, POD #0.  Patient has R LUIZA posteriolateral precautions and WBAT.  He demonstrated impaired movement (transfers, gait, bed mob, stairs), strength, and pain.  He will benefit from ongoing PT services with recommendation for  LOW intensity PT services at time of medical DC.  End of Session Patient Position: Up in chair, Alarm on   IP OR SWING BED PT PLAN  Inpatient or Swing Bed: Inpatient  PT Plan  Treatment/Interventions: Bed mobility, Transfer training, Gait training, Stair training, Strengthening, Therapeutic exercise, Therapeutic activity, Home exercise program, Positioning, Postural re-education, Balance training  PT Plan: Ongoing PT  PT Frequency: BID  PT Discharge Recommendations: Low intensity level of continued care  Equipment Recommended upon Discharge:  (patient has FWW; will benefit from toilet seat riser and cane vs. crutches for stairs to access second level of home.)  PT Recommended Transfer Status: Assistive device, Contact guard  PT - OK to Discharge: Yes      Subjective     General Visit Information:  General  Reason for Referral:  "impaired mobility, POD #0 s/p R LUIZA with Dr. Caruso.  Referred By: Dr. Mcclendon  Past Medical History Relevant to Rehab:   Past Medical History:   Diagnosis Date    Osteoarthritis     Pericardial effusion ([facility])     Pericardial effusion ([facility]) 05/04/2023     Family/Caregiver Present: No  Prior to Session Communication: Bedside nurse  Patient Position Received: Bed, 3 rail up, Alarm on  Preferred Learning Style: auditory, kinesthetic, verbal  General Comment: Patient recieved in supine. PIV, SCD. Cooperative and motivated. \"I want to move\"  Home Living:  Home Living  Type of Home: House  Lives With: Spouse, Dependent children  Home Adaptive Equipment: Walker rolling or standard  Home Layout: One level, Multi-level, 1/2 bath on main level, Stairs to alternate level with rails (planning to stay on main level upon DC)  Alternate Level Stairs-Rails:  (single railing)  Alternate Level Stairs-Number of Steps: 15  Entrance Stairs-Rails: Both  Entrance Stairs-Number of Steps: 4  Bathroom Shower/Tub: Walk-in shower  Bathroom Toilet: Standard  Prior Level of Function:  Prior Function Per Pt/Caregiver Report  Level of Towns: Independent with ADLs and functional transfers, Independent with homemaking with ambulation  Receives Help From: Family  ADL Assistance: Independent  Homemaking Assistance: Independent  Ambulatory Assistance: Independent  Vocational: Full time employment (supervisor)  Leisure: spending time with family  Prior Function Comments: patient ambulatory without device community distances; progressive R hip pain limiting his participation in acitivities  Precautions:  Precautions  LE Weight Bearing Status: Weight Bearing as Tolerated  Medical Precautions: Fall precautions  Post-Surgical Precautions: Right hip precautions  Precautions Comment: reviewed R LUIZA and WBAT precautions    Vital Signs (Past 2hrs)                Objective   Pain:  Pain Assessment  Pain Assessment: 0-10  0-10 (Numeric) Pain Score: " 9  Camilo-Baker FACES Pain Rating: Hurts even more  Pain Type: Surgical pain  Pain Location: Hip  Pain Orientation: Right  Pain Descriptors: Discomfort, Sharp, Pressure, Throbbing  Pain Frequency: Constant/continuous  Pain Onset: Ongoing  Clinical Progression: Not changed  Patient's Stated Pain Goal: No pain  Pain Interventions: Medication (See MAR), Cold applied, Repositioned, Ambulation/increased activity, Distraction, Elevated, Relaxation technique, Rest  Response to Interventions: PT to tolerance; patient's RN aware of elevated pain.  Cognition:  Cognition  Overall Cognitive Status: Within Functional Limits  Orientation Level: Oriented X4    General Assessments:  General Observation  General Observation: patient completed mobility with excellent carryover of instructions and fluid movements; patient is very motivated to regain his functional I.    Activity Tolerance  Endurance: Endurance does not limit participation in activity  Early Mobility/Exercise Safety Screen: Proceed with mobilization - No exclusion criteria met  Activity Tolerance Comments: good    Sensation  Light Touch: No apparent deficits  Sharp/Dull: No apparent deficits    Strength  Strength Comments: BUE and LLE WFL; RLE grossly >3/5 as evidenced by functional mobility  Strength  Strength Comments: BUE and LLE WFL; RLE grossly >3/5 as evidenced by functional mobility    Coordination  Movements are Fluid and Coordinated: Yes    Postural Control  Postural Control: Within Functional Limits    Static Sitting Balance  Static Sitting-Comment/Number of Minutes: supv at EOB without UE support  Dynamic Sitting Balance  Dynamic Sitting-Comments: SBA to CGA at EOB with UE support    Static Standing Balance  Static Standing-Comment/Number of Minutes: min A with FWW support  Dynamic Standing Balance  Dynamic Standing-Comments: min A with FWW support  Functional Assessments:  Bed Mobility  Bed Mobility: Yes  Bed Mobility 1  Bed Mobility 1: Scooting  Level of  Assistance 1: Set up, Minimum assistance, Moderate verbal cues, Moderate tactile cues  Bed Mobility Comments 1: instruction for sequencing, RLE positioning and support from PT for proper alignment with respect to R LUIZA precautions (increased time and cues for breathing)  Bed Mobility 2  Bed Mobility  2: Supine to sitting  Level of Assistance 2: Minimum assistance, Set up, Moderate verbal cues, Moderate tactile cues  Bed Mobility Comments 2: physical support of RLE and HOB ~30 deg elevated, cues for adherence to LUIZA precautions and seuqencing    Transfers  Transfer: Yes  Transfer 1  Technique 1: Sit to stand  Transfer Device 1: Gait belt, Walker  Transfer Level of Assistance 1: Moderate verbal cues, Moderate tactile cues, Minimum assistance  Trials/Comments 1: slightly elevated surface (instruction/demo and pt cued for RLE positoining and hand placement, walker safety)  Transfers 2  Technique 2: Stand to sit, Sit to stand  Transfer Device 2: Walker, Gait belt  Transfer Level of Assistance 2: Moderate verbal cues, Moderate tactile cues, Minimum assistance  Trials/Comments 2: instruction for advancement of RLE positoining and transitional hand placement/safety    Ambulation/Gait Training  Ambulation/Gait Training Performed: Yes  Ambulation/Gait Training 1  Surface 1: Level tile  Device 1: Rolling walker  Gait Support Devices: Gait belt  Assistance 1: Moderate verbal cues, Minimal tactile cues, Minimum assistance  Quality of Gait 1: Wide base of support, Inconsistent stride length, Diminished heel strike, Decreased step length, Forward flexed posture, Antalgic  Comments/Distance (ft) 1: 8 feet with step to pattern initially and instruciton for walker placement/sequencing.  Ambulation/Gait Training 2  Surface 2: Level tile  Device 2: Rolling walker  Gait Support Devices: Gait belt  Assistance 2: Set up, Moderate verbal cues, Moderate tactile cues, Minimum assistance  Quality of Gait 2: Wide base of support, Diminished  heel strike, Inconsistent stride length, Decreased step length, Antalgic  Comments/Distance (ft) 2: 20 feet with step to pattern and increased BUE tension/support. cues for progressive mobility  Ambulation/Gait Training 3  Surface 3: Level tile  Device 3: Rolling walker  Gait Support Devices: Gait belt  Assistance 3: Minimum assistance, Moderate verbal cues, Moderate tactile cues  Quality of Gait 3: Decreased step length, Inconsistent stride length, Antalgic  Comments/Distance (ft) 3: 8 feet with forward/retro stepping and cues for seqeucning walker placement with approach to chair.    Stairs  Stairs: No  Extremity/Trunk Assessments:  Cervical Spine   Cervical Spine: Within Functional Limits  Lumbar Spine   Lumbar Spine : Within Functional Limits    RUE   RUE : Within Functional Limits  LUE   LUE: Within Functional Limits  RLE   RLE : Exceptions to WFL (RLE AROM/strength limited by postop precautions and pain, grossly 3/5)  LLE   LLE : Within Functional Limits  Treatments:  Therapeutic Exercise  Therapeutic Exercise Performed: Yes  Therapeutic Exercise Activity 1: AP, QS, GS, HS (min A), SAQ, ABD (min A), LAQ x10 reps with cues for alignment, sequencing and paired breathing    Therapeutic Activity  Therapeutic Activity Performed: Yes  Therapeutic Activity 1: static standing with gait belt and LUE support on grabbar in bathroom x2 minutes with increased LLE WBing; pt then ambulated to sink with FWW and min A; no UE support with good balance x1 minute  Therapeutic Activity 2: instruction and education provided for mobility and HEP with respect to LUIZA postop precautions    Bed Mobility  Bed Mobility: Yes  Bed Mobility 1  Bed Mobility 1: Scooting  Level of Assistance 1: Set up, Minimum assistance, Moderate verbal cues, Moderate tactile cues  Bed Mobility Comments 1: instruction for sequencing, RLE positioning and support from PT for proper alignment with respect to R LUIZA precautions (increased time and cues for  breathing)  Bed Mobility 2  Bed Mobility  2: Supine to sitting  Level of Assistance 2: Minimum assistance, Set up, Moderate verbal cues, Moderate tactile cues  Bed Mobility Comments 2: physical support of RLE and HOB ~30 deg elevated, cues for adherence to LUIZA precautions and seuqencing    Ambulation/Gait Training  Ambulation/Gait Training Performed: Yes  Ambulation/Gait Training 1  Surface 1: Level tile  Device 1: Rolling walker  Gait Support Devices: Gait belt  Assistance 1: Moderate verbal cues, Minimal tactile cues, Minimum assistance  Quality of Gait 1: Wide base of support, Inconsistent stride length, Diminished heel strike, Decreased step length, Forward flexed posture, Antalgic  Comments/Distance (ft) 1: 8 feet with step to pattern initially and instruciton for walker placement/sequencing.  Ambulation/Gait Training 2  Surface 2: Level tile  Device 2: Rolling walker  Gait Support Devices: Gait belt  Assistance 2: Set up, Moderate verbal cues, Moderate tactile cues, Minimum assistance  Quality of Gait 2: Wide base of support, Diminished heel strike, Inconsistent stride length, Decreased step length, Antalgic  Comments/Distance (ft) 2: 20 feet with step to pattern and increased BUE tension/support. cues for progressive mobility  Ambulation/Gait Training 3  Surface 3: Level tile  Device 3: Rolling walker  Gait Support Devices: Gait belt  Assistance 3: Minimum assistance, Moderate verbal cues, Moderate tactile cues  Quality of Gait 3: Decreased step length, Inconsistent stride length, Antalgic  Comments/Distance (ft) 3: 8 feet with forward/retro stepping and cues for seqeucning walker placement with approach to chair.  Transfers  Transfer: Yes  Transfer 1  Technique 1: Sit to stand  Transfer Device 1: Gait belt, Walker  Transfer Level of Assistance 1: Moderate verbal cues, Moderate tactile cues, Minimum assistance  Trials/Comments 1: slightly elevated surface (instruction/demo and pt cued for RLE positoining and  hand placement, walker safety)  Transfers 2  Technique 2: Stand to sit, Sit to stand  Transfer Device 2: Walker, Gait belt  Transfer Level of Assistance 2: Moderate verbal cues, Moderate tactile cues, Minimum assistance  Trials/Comments 2: instruction for advancement of RLE positoining and transitional hand placement/safety  Outcome Measures:  [facility] Basic Mobility  Turning from your back to your side while in a flat bed without using bedrails: A little  Moving from lying on your back to sitting on the side of a flat bed without using bedrails: A little  Moving to and from bed to chair (including a wheelchair): A little  Standing up from a chair using your arms (e.g. wheelchair or bedside chair): A little  To walk in hospital room: A little  Climbing 3-5 steps with railing: Total  Basic Mobility - Total Score: 16    Encounter Problems       Encounter Problems (Active)       PT Problem       Patient will complete bed mobility with HOB flat, Mod I with adherence to precautions  (Progressing)       Start:  10/07/24    Expected End:  10/10/24            Patient will complete all transfers with LRAD and mod I, from all surfaces/heights and with adherence to LUIZA precautions.  (Progressing)       Start:  10/07/24    Expected End:  10/10/24            Patient will ambulate >250 feet with LRAD and mod I, using reciprocal steady pattern, and adhering to LUIZA precautions.  (Progressing)       Start:  10/07/24    Expected End:  10/10/24            Patient will ascend/descend 4 stairs with supervision, bilat HR vs. single HR and LRAD, demonstrating safe technique and adhering to LUIZA precautions  (Progressing)       Start:  10/07/24    Expected End:  10/10/24            Patient will demonstrate independence with LUIZA HEP x20 reps with proper sequencing, demonstrating >4/5 strength and independent LLE activation with activity and therex.   (Progressing)       Start:  10/07/24    Expected End:  10/10/24                   Education  Documentation  Handouts, taught by Marielena Boykin PT at 10/7/2024  6:28 PM.  Learner: Patient  Readiness: Acceptance  Method: Explanation, Demonstration, Handout  Response: Demonstrated Understanding, Verbalizes Understanding  Comment: patient instructed on PT's role, safety with mobility, precautions, HEP, bed mob, transfers, gait training, use of ice, postural corrections, seuqencing and use of FWW. very engaged in session with good carryover.    Precautions, taught by Marielena Boykin PT at 10/7/2024  6:28 PM.  Learner: Patient  Readiness: Acceptance  Method: Explanation, Demonstration, Handout  Response: Demonstrated Understanding, Verbalizes Understanding  Comment: patient instructed on PT's role, safety with mobility, precautions, HEP, bed mob, transfers, gait training, use of ice, postural corrections, seuqencing and use of FWW. very engaged in session with good carryover.    Body Mechanics, taught by Marielena Boykin PT at 10/7/2024  6:28 PM.  Learner: Patient  Readiness: Acceptance  Method: Explanation, Demonstration, Handout  Response: Demonstrated Understanding, Verbalizes Understanding  Comment: patient instructed on PT's role, safety with mobility, precautions, HEP, bed mob, transfers, gait training, use of ice, postural corrections, seuqencing and use of FWW. very engaged in session with good carryover.    Home Exercise Program, taught by Marielena Boykin PT at 10/7/2024  6:28 PM.  Learner: Patient  Readiness: Acceptance  Method: Explanation, Demonstration, Handout  Response: Demonstrated Understanding, Verbalizes Understanding  Comment: patient instructed on PT's role, safety with mobility, precautions, HEP, bed mob, transfers, gait training, use of ice, postural corrections, seuqencing and use of FWW. very engaged in session with good carryover.    Mobility Training, taught by Marielena Boykin PT at 10/7/2024  6:28 PM.  Learner: Patient  Readiness: Acceptance  Method: Explanation, Demonstration,  Handout  Response: Demonstrated Understanding, Verbalizes Understanding  Comment: patient instructed on PT's role, safety with mobility, precautions, HEP, bed mob, transfers, gait training, use of ice, postural corrections, seuqencing and use of FWW. very engaged in session with good carryover.    Education Comments  No comments found.

## 2024-10-08 ENCOUNTER — DOCUMENTATION (OUTPATIENT)
Dept: HOME HEALTH SERVICES | Facility: HOME HEALTH | Age: 34
End: 2024-10-08
Payer: COMMERCIAL

## 2024-10-08 ENCOUNTER — PHARMACY VISIT (OUTPATIENT)
Dept: PHARMACY | Facility: CLINIC | Age: 34
End: 2024-10-08
Payer: COMMERCIAL

## 2024-10-08 VITALS
BODY MASS INDEX: 29.8 KG/M2 | WEIGHT: 224.87 LBS | SYSTOLIC BLOOD PRESSURE: 120 MMHG | HEIGHT: 73 IN | DIASTOLIC BLOOD PRESSURE: 69 MMHG | RESPIRATION RATE: 18 BRPM | HEART RATE: 89 BPM | TEMPERATURE: 98.6 F | OXYGEN SATURATION: 96 %

## 2024-10-08 LAB
ANION GAP SERPL CALC-SCNC: 8 MMOL/L (ref 10–20)
BUN SERPL-MCNC: 11 MG/DL (ref 6–23)
CALCIUM SERPL-MCNC: 8.2 MG/DL (ref 8.6–10.3)
CHLORIDE SERPL-SCNC: 101 MMOL/L (ref 98–107)
CO2 SERPL-SCNC: 30 MMOL/L (ref 21–32)
CREAT SERPL-MCNC: 0.75 MG/DL (ref 0.5–1.3)
EGFRCR SERPLBLD CKD-EPI 2021: >90 ML/MIN/1.73M*2
ERYTHROCYTE [DISTWIDTH] IN BLOOD BY AUTOMATED COUNT: 12.9 % (ref 11.5–14.5)
GLUCOSE SERPL-MCNC: 99 MG/DL (ref 74–99)
HCT VFR BLD AUTO: 31.9 % (ref 41–52)
HGB BLD-MCNC: 10.5 G/DL (ref 13.5–17.5)
MCH RBC QN AUTO: 28.5 PG (ref 26–34)
MCHC RBC AUTO-ENTMCNC: 32.9 G/DL (ref 32–36)
MCV RBC AUTO: 87 FL (ref 80–100)
NRBC BLD-RTO: 0 /100 WBCS (ref 0–0)
PLATELET # BLD AUTO: 198 X10*3/UL (ref 150–450)
POTASSIUM SERPL-SCNC: 3.6 MMOL/L (ref 3.5–5.3)
RBC # BLD AUTO: 3.68 X10*6/UL (ref 4.5–5.9)
SODIUM SERPL-SCNC: 135 MMOL/L (ref 136–145)
WBC # BLD AUTO: 7.4 X10*3/UL (ref 4.4–11.3)

## 2024-10-08 PROCEDURE — 97165 OT EVAL LOW COMPLEX 30 MIN: CPT | Mod: GO | Performed by: OCCUPATIONAL THERAPIST

## 2024-10-08 PROCEDURE — 2500000001 HC RX 250 WO HCPCS SELF ADMINISTERED DRUGS (ALT 637 FOR MEDICARE OP): Performed by: STUDENT IN AN ORGANIZED HEALTH CARE EDUCATION/TRAINING PROGRAM

## 2024-10-08 PROCEDURE — 36415 COLL VENOUS BLD VENIPUNCTURE: CPT | Performed by: STUDENT IN AN ORGANIZED HEALTH CARE EDUCATION/TRAINING PROGRAM

## 2024-10-08 PROCEDURE — 7100000011 HC EXTENDED STAY RECOVERY HOURLY - NURSING UNIT

## 2024-10-08 PROCEDURE — 85027 COMPLETE CBC AUTOMATED: CPT | Performed by: STUDENT IN AN ORGANIZED HEALTH CARE EDUCATION/TRAINING PROGRAM

## 2024-10-08 PROCEDURE — 97116 GAIT TRAINING THERAPY: CPT | Mod: GP,CQ

## 2024-10-08 PROCEDURE — 97535 SELF CARE MNGMENT TRAINING: CPT | Mod: GO | Performed by: OCCUPATIONAL THERAPIST

## 2024-10-08 PROCEDURE — 80048 BASIC METABOLIC PNL TOTAL CA: CPT | Performed by: STUDENT IN AN ORGANIZED HEALTH CARE EDUCATION/TRAINING PROGRAM

## 2024-10-08 PROCEDURE — 97110 THERAPEUTIC EXERCISES: CPT | Mod: GP,CQ

## 2024-10-08 PROCEDURE — 2500000004 HC RX 250 GENERAL PHARMACY W/ HCPCS (ALT 636 FOR OP/ED): Mod: JZ | Performed by: STUDENT IN AN ORGANIZED HEALTH CARE EDUCATION/TRAINING PROGRAM

## 2024-10-08 PROCEDURE — 97530 THERAPEUTIC ACTIVITIES: CPT | Mod: GO | Performed by: OCCUPATIONAL THERAPIST

## 2024-10-08 ASSESSMENT — PAIN - FUNCTIONAL ASSESSMENT
PAIN_FUNCTIONAL_ASSESSMENT: 0-10
PAIN_FUNCTIONAL_ASSESSMENT: 0-10

## 2024-10-08 ASSESSMENT — COGNITIVE AND FUNCTIONAL STATUS - GENERAL
WALKING IN HOSPITAL ROOM: A LITTLE
HELP NEEDED FOR BATHING: A LITTLE
CLIMB 3 TO 5 STEPS WITH RAILING: A LITTLE
DRESSING REGULAR LOWER BODY CLOTHING: A LITTLE
STANDING UP FROM CHAIR USING ARMS: A LITTLE
MOVING TO AND FROM BED TO CHAIR: A LITTLE
PERSONAL GROOMING: A LITTLE
MOVING FROM LYING ON BACK TO SITTING ON SIDE OF FLAT BED WITH BEDRAILS: A LITTLE
MOVING TO AND FROM BED TO CHAIR: A LITTLE
MOBILITY SCORE: 18
TURNING FROM BACK TO SIDE WHILE IN FLAT BAD: A LITTLE
TOILETING: A LITTLE
STANDING UP FROM CHAIR USING ARMS: A LITTLE
TURNING FROM BACK TO SIDE WHILE IN FLAT BAD: A LITTLE
DAILY ACTIVITIY SCORE: 21
HELP NEEDED FOR BATHING: A LITTLE
DRESSING REGULAR LOWER BODY CLOTHING: A LITTLE
MOBILITY SCORE: 18
CLIMB 3 TO 5 STEPS WITH RAILING: A LITTLE
MOVING FROM LYING ON BACK TO SITTING ON SIDE OF FLAT BED WITH BEDRAILS: A LITTLE
TOILETING: A LITTLE
DRESSING REGULAR UPPER BODY CLOTHING: A LITTLE
WALKING IN HOSPITAL ROOM: A LITTLE
DAILY ACTIVITIY SCORE: 19

## 2024-10-08 ASSESSMENT — PAIN DESCRIPTION - ORIENTATION
ORIENTATION: RIGHT
ORIENTATION: RIGHT

## 2024-10-08 ASSESSMENT — PAIN SCALES - GENERAL
PAINLEVEL_OUTOF10: 7
PAINLEVEL_OUTOF10: 3
PAINLEVEL_OUTOF10: 6
PAINLEVEL_OUTOF10: 9
PAINLEVEL_OUTOF10: 7

## 2024-10-08 ASSESSMENT — PAIN DESCRIPTION - LOCATION
LOCATION: HIP
LOCATION: HIP

## 2024-10-08 ASSESSMENT — ACTIVITIES OF DAILY LIVING (ADL)
ADL_ASSISTANCE: INDEPENDENT
HOME_MANAGEMENT_TIME_ENTRY: 14

## 2024-10-08 NOTE — PROGRESS NOTES
"Physical Therapy                 Therapy Communication Note    Patient Name: Christiano Gasca \"Armand\"  MRN: [id number]  Department: [facility]   Room: [address] [place] [address]  Today's Date: 10/8/2024     Discipline: Physical Therapy    Missed Visit Reason: Missed Visit Reason:  (pt rating pain 10/10, just got pain meds, will defer at this time)    Missed Time: Attempt    Comment:  "

## 2024-10-08 NOTE — PROGRESS NOTES
"Occupational Therapy    Evaluation/Treatment    Patient Name: Christiano Gasca \"Armand\"  MRN: [id number]  Department: [facility]  Room: [address] [place] [address]  Today's Date: 10/08/24  Time Calculation  Start Time: 0948  Stop Time: 1026  Time Calculation (min): 38 min       Assessment:  OT Assessment: Pt is POD#1 R LUIZA with posterior hip precautions and demos decreased balance, strength, endurance, ROM and safety awareness resulting in decreased safety and independence with ADLs/IADLs. Pt requires skilled OT services to address above deficits to safely return to OF.  Prognosis: Good  Evaluation/Treatment Tolerance: Patient limited by pain  Medical Staff Made Aware: Yes  End of Session Communication: Bedside nurse  End of Session Patient Position: Up in chair, Alarm on (spouse in room)  OT Assessment Results: Decreased ADL status, Decreased safe judgment during ADL, Decreased endurance, Decreased functional mobility, Decreased IADLs, Decreased trunk control for functional activities  Prognosis: Good  Evaluation/Treatment Tolerance: Patient limited by pain  Medical Staff Made Aware: Yes  Strengths: Premorbid level of function, Ability to acquire knowledge, Support and attitude of living partners  Barriers to Participation: Comorbidities, Housing layout  Plan:  Treatment Interventions: ADL retraining, Functional transfer training, Endurance training, Patient/family training, Equipment evaluation/education, Compensatory technique education  OT Frequency: 3 times per week  OT Discharge Recommendations: Low intensity level of continued care  Equipment Recommended upon Discharge:  (pt purchasing toilet seat riser, educated on purchasing hip kit and potentially shower chair for increased safety and ease with ADLs and to adhere to hip precautions)  OT Recommended Transfer Status: Assist of 1  OT - OK to Discharge: Yes (OT POC established this date)  Treatment Interventions: ADL retraining, Functional transfer training, Endurance training, " Patient/family training, Equipment evaluation/education, Compensatory technique education    Subjective     General:   OT Received On: 10/08/24  General  Reason for Referral: Pt is POD#1 R LUIZA with Dr. Caruso.  Referred By: Dr. Mcclendon  Past Medical History Relevant to Rehab:   Past Medical History:   Diagnosis Date    Osteoarthritis     Pericardial effusion (HHS-HCC)     Pericardial effusion (HHS-HCC) 05/04/2023      Family/Caregiver Present: Yes  Caregiver Feedback: spouse present  Prior to Session Communication: Bedside nurse  Patient Position Received: Bed, 3 rail up, Alarm on  General Comment: Pt supine in bed upon arrival and agreeable to OT Eval/tx. Pt fully participatory, extra time to complete all functional tasks d/t significant R hip pain with activity.  Precautions:  LE Weight Bearing Status: Weight Bearing as Tolerated  Medical Precautions: Fall precautions  Post-Surgical Precautions: Right hip precautions (posterior)  Precautions Comment: pt able to recall 3/3 hip precautions at start of session, reviewed WBAT status              Pain:  Pain Assessment  Pain Assessment: 0-10  0-10 (Numeric) Pain Score:  (0/10 pain at rest, 10/10 end of session, RN notified)  Pain Type: Surgical pain  Pain Location: Hip  Pain Orientation: Right  Pain Interventions: Repositioned, Ambulation/increased activity, Cold applied    Objective   Cognition:  Overall Cognitive Status: Within Functional Limits  Orientation Level: Oriented X4  Insight: Within function limits  Impulsive: Within functional limits           Home Living:  Type of Home: House  Lives With: Spouse, Dependent children (x4 children)  Home Adaptive Equipment: Walker rolling or standard  Home Layout: Multi-level, Able to live on main level with bedroom/bathroom, 1/2 bath on main level, Stairs to alternate level with rails (pt planning to stay on main level upon DC)  Alternate Level Stairs-Rails:  (x1 HR)  Alternate Level Stairs-Number of Steps: 15  Home Access:  Stairs to enter with rails  Entrance Stairs-Rails: Both  Entrance Stairs-Number of Steps: 4  Bathroom Shower/Tub: Walk-in shower  Bathroom Toilet: Standard (pt reports purchasing toilet seat riser)  Bathroom Equipment:  (pt is purchasing toilet seat riser prior to DC)  Prior Function:  Level of [place]: Independent with ADLs and functional transfers, Independent with homemaking with ambulation  Receives Help From: Family  ADL Assistance: Independent  Homemaking Assistance: Independent  Ambulatory Assistance: Independent (no AD)  Vocational: Full time employment (Supervisor)  Prior Function Comments: + driving. pt denies recent falls       ADL:  LE Dressing Assistance: Minimal  LE Dressing Deficit:  (to don undergarment/shorts, don/doff socks and B shoes, cues for sequencing and correct use of AE to adhere to R LUIZA precautions, extra time to complete all tasks d/t pain)  Toileting Assistance with Device:  (pt educated on compensatory techniques for panda hygiene during toileting in order to adhere to R posterior hip precautions)  Activities of Daily Living: LE Dressing  LE Dressing: Yes  LE Dressing Adaptive Equipment: Reacher, Sock aide  Pants Level of Assistance: Minimum assistance, Minimal verbal cues  Sock Level of Assistance: Minimum assistance, Minimal verbal cues  Shoe Level of Assistance: Minimum assistance, Minimal verbal cues  LE Dressing Where Assessed: Chair  LE Dressing Comments: cues for sequencing, cade dressing technique and correct use of AE to adhere to R LUIZA precautions    Toileting  Toileting Comments: educated pt and spouse on compensatory techniques for panda hygiene in order to adhere to hip precautions as well as purchasing toilet seat riser vs. BSC frame over toilet to adhere to hip precautions  Activity Tolerance:  Endurance: Tolerates 30 min exercise with multiple rests       Bed Mobility/Transfers: Bed Mobility  Bed Mobility: Yes  Bed Mobility 1  Bed Mobility 1: Supine to sitting  Level  of Assistance 1: Minimum assistance, Moderate verbal cues, Minimal tactile cues  Bed Mobility Comments 1: HOB slightly elevated, cues for sequencing, B LE positioning, UR positioning (propping up on forearms) and to adhere to R posterior hip precautions, assist on R LE d/t pain, extra time to perform    Transfers  Transfer: Yes  Transfer 1  Technique 1: Sit to stand  Transfer Device 1: Walker  Transfer Level of Assistance 1: Contact guard, Minimal verbal cues  Trials/Comments 1: cues for sequencing, safe hand placement, R LE position and walker safety, from EOB and chair  Transfers 2  Technique 2: Stand to sit  Transfer Device 2: Walker  Transfer Level of Assistance 2: Close supervision, Minimal verbal cues  Trials/Comments 2: cues for sequencing, safe hand placement, walker safety and R LE position      Functional Mobility:  Functional Mobility  Functional Mobility Performed: Yes  Functional Mobility 1  Comments 1: Pt functionally navigated x min household distance in room using FWW with initial CGA but progressed to SBA x1. Cues for LE sequencing and walker safety to adhere to hip precautions. Extra time to perform d/t R LE pain, no LOB Noted.  Sitting Balance:  Static Sitting Balance  Static Sitting-Balance Support: Bilateral upper extremity supported, Feet supported  Static Sitting-Level of Assistance: Close supervision  Static Sitting-Comment/Number of Minutes: at EOB  Dynamic Sitting Balance  Dynamic Sitting-Comments: SBA at EOB  Standing Balance:  Static Standing Balance  Static Standing-Balance Support: Bilateral upper extremity supported  Static Standing-Level of Assistance: Close supervision  Static Standing-Comment/Number of Minutes: FWW  Dynamic Standing Balance  Dynamic Standing-Comments: SBA-CGA using FWW       Therapy/Activity: Therapeutic Activity  Therapeutic Activity Performed: Yes  Therapeutic Activity 1: Pt and spouse educated on R LUIZA packet including R posterior hip precautions, R LE WBAT, LB  ADL AE, DME for safe homegoing, safe bed mobility and functional transfer/mobility techniques, compensatory techniques for ADLs/IADLs, etc. Pt and spouse receptive to all education, verbalized understanding.    Sensation:  Light Touch: No apparent deficits  Strength:  Strength Comments: B UEs grossly WFL     Coordination:  Movements are Fluid and Coordinated: Yes        Extremities: RUE   RUE : Within Functional Limits and LUE   LUE: Within Functional Limits      Outcome Measures: [facility] Daily Activity  Putting on and taking off regular lower body clothing: A little  Bathing (including washing, rinsing, drying): A little  Putting on and taking off regular upper body clothing: None  Toileting, which includes using toilet, bedpan or urinal: A little  Taking care of personal grooming such as brushing teeth: None  Eating Meals: None  Daily Activity - Total Score: 21        Education Documentation  Handouts, taught by Sarah Mendoza OT at 10/8/2024 11:23 AM.  Learner: Patient  Readiness: Acceptance  Method: Explanation, Demonstration, Handout  Response: Verbalizes Understanding    Body Mechanics, taught by Sarah Mendoza OT at 10/8/2024 11:23 AM.  Learner: Patient  Readiness: Acceptance  Method: Explanation, Demonstration, Handout  Response: Verbalizes Understanding    Precautions, taught by Sarah Mendoza OT at 10/8/2024 11:23 AM.  Learner: Patient  Readiness: Acceptance  Method: Explanation, Demonstration, Handout  Response: Verbalizes Understanding    ADL Training, taught by Sarah Mendoza OT at 10/8/2024 11:23 AM.  Learner: Patient  Readiness: Acceptance  Method: Explanation, Demonstration, Handout  Response: Verbalizes Understanding    Education Comments  No comments found.           Goals:  Encounter Problems       Encounter Problems (Active)       ADLs       Patient will perform UB and LB bathing with modified independent level of assistance and grab bars, shower chair, and long-handled sponge.       Start:   10/08/24    Expected End:  10/22/24            Patient with complete lower body dressing with modified independent level of assistance donning and doffing all LE clothes  with PRN adaptive equipment.       Start:  10/08/24    Expected End:  10/22/24            Patient will complete toileting including hygiene clothing management/hygiene with modified independent level of assistance and raised toilet seat and grab bars.       Start:  10/08/24    Expected End:  10/22/24               MOBILITY       Patient will perform Functional mobility x Household distances/Community Distances with modified independent level of assistance and least restrictive device in order to improve safety and functional mobility.       Start:  10/08/24    Expected End:  10/22/24               TRANSFERS       Patient will perform bed mobility modified independent level of assistance in order to improve safety and independence with mobility       Start:  10/08/24    Expected End:  10/22/24            Patient will complete functional transfers with least restrictive device with modified independent level of assistance.       Start:  10/08/24    Expected End:  10/22/24

## 2024-10-08 NOTE — PROGRESS NOTES
"Orthopaedic Surgery Progress Note    S:    REGINO. Pain controlled on current regimen.  Denies any new onset numbness, tingling or weakness. Denies nausea, vomiting, chest pain, dyspnea, or calf tenderness. Denies any fever or chills. Did well with PT last night.     O:  /63 (BP Location: Right arm, Patient Position: Sitting)   Pulse 76   Temp 36.8 °C (98.2 °F) (Temporal)   Resp 14   Ht 1.842 m (6' 0.5\")   Wt 102 kg (224 lb 13.9 oz)   SpO2 98%   BMI 30.08 kg/m²     GEN - NAD, resting comfortably in hospital bed  HEENT - MMM, EOMI, NCAT   CV - RRR by peripheral palpation, limbs wwp  PULM - NWOB   ABD - Non-distended  NEURO - SCHNEIDER spontaneously, CNs II - XII grossly intact  PSYCH - Appropriate mood and affect    MSK:  RLE:   -Post-operative dressing/splint in place without strikethrough bleeding.   -Motor intact in DF/PF/EHL/FHL, SILT in saph/sural/SPN/DPN/tibial distributions  -Foot wwp, brisk cap refill, 2+ DP pulse      A/P: 33M no PMH S/P R LUIZA with Dr. Caruso on 10/7.     -Clear liquid diet. Advance diet as tolerated  -Bowel regimen of colace, senna, miralax, and dulcolax PRN  -Tylenol, oxy 5/10, dilaudid PRN, IV toradol 15mg q6hr x 4 doses for pain management  -Continue LR@100; HLIV with good PO intake, BMP POD1  -PT/OT consult; WB status: WBAT RLE; Posterior hip precautions: do not cross leg over midline, do not bend at waist more than 90 degrees, do not rotate foot or leg inwards.  -Encourage IS   -Perioperative abx - ancef 24 hours,   -F/u CBC in AM, No indication for transfusion; 1x PO TXA on floor  -DVT ppx: SCDs + TEDs, ASA 81 bid   -Continue home medications - None  -Glycemic: No issues     Dispo: dc home today    Plan was discussed with Dr. Caruso.     Orthopedic General Team (contact via epic chat):     Alfred Lindsay, PGY-3   Juventino Mcclendon, PGY-5     Please call on call orthopedic NP on nights after 6pm and weekends    "

## 2024-10-08 NOTE — PROGRESS NOTES
"Physical Therapy                 Therapy Communication Note    Patient Name: Christiano Gasca \"Armand\"  MRN: [id number]  Department: [facility] A   Room: [facility]  Today's Date: 10/8/2024     Discipline: Physical Therapy    Missed Visit Reason: Missed Visit Reason:  (pt with OT at this time)    Missed Time: Attempt    Comment:  "

## 2024-10-08 NOTE — NURSING NOTE
Met with Patient and Care Partner at bedside- Patient is s/p Right Posterior Hip Replacement with Dr. Caruso. Discussion with patient included education on the following topics: TJR Education: Wound Care (Bandage Care & Removal, Personal Hygiene & Infection Prevention), Post-Op Activity (Home PT Regimen, Movement Precautions, Assistive Equipment & 1-2hr Movement), Post-Op Precautions (Falls, Blood Clots & Constipation), Cold-Therapy (Ice vs. Cold Therapy Machines) , Methods for Symptom Management (Pain, Nausea, Swelling & Constipation), Importance of Post-op Prescriptions, How to Obtain Medication Refills, When to Resume Driving & Who to Call, Use of [employer] & Staff Contact Information, When to call 9-1-1, and When to call the Surgeon's Office. Patient attended joint class prior to surgery. Patient is able to verbalize understanding of class content/discussion. Contact information was provided to patient for support and assistance during the post-operative period. He was given My Medication Education tool as a reference for his discharge medications.

## 2024-10-08 NOTE — PROGRESS NOTES
"Physical Therapy    Physical Therapy Treatment    Patient Name: Christiano Gasca \"Armand\"  MRN: [id number]  Department:   Room: [address] [place] [address]  Today's Date: 10/8/2024  Time Calculation  Start Time: 1123  Stop Time: 1204  Time Calculation (min): 41 min         Assessment/Plan   PT Assessment  End of Session Communication: Bedside nurse  Assessment Comment: pt able to complete stair training  End of Session Patient Position: Alarm on, Bed, 3 rail up  PT Plan  Inpatient/Swing Bed or Outpatient: Inpatient  PT Plan  Treatment/Interventions: Bed mobility, Transfer training, Gait training, Stair training  PT Plan: Ongoing PT  PT Frequency: BID  PT Discharge Recommendations: Low intensity level of continued care  Equipment Recommended upon Discharge:  (patient has FWW; will benefit from toilet seat riser and cane vs. crutches for stairs to access second level of home.)  PT Recommended Transfer Status: Assist x1  PT - OK to Discharge: Yes (per POC)      General Visit Information:   PT  Visit  PT Received On: 10/08/24  General  Reason for Referral: R LUIZA  Referred By: Dr. Mcclendon  Family/Caregiver Present: Yes  Caregiver Feedback: spouse present  Prior to Session Communication: Bedside nurse  Patient Position Received: Up in chair, Alarm on  Preferred Learning Style: auditory, kinesthetic, verbal  General Comment: agreeable to tx, slow moving 2/2 pain    Subjective   Precautions:  Precautions  LE Weight Bearing Status: Weight Bearing as Tolerated  Medical Precautions: Fall precautions  Post-Surgical Precautions: Right hip precautions            Objective   Pain:  Pain Assessment  0-10 (Numeric) Pain Score: 6  Pain Type: Surgical pain  Pain Location: Hip  Pain Orientation: Right  Pain Interventions:  (meds given prior to tx)  Cognition:  Cognition  Overall Cognitive Status: Within Functional Limits  Coordination:     Postural Control:     Extremity/Trunk Assessments:    Activity Tolerance:     Treatments:  Therapeutic " Exercise  Therapeutic Exercise Performed: Yes  Therapeutic Exercise Activity 1: pt performed supine ther ex x15 : AP, QS, GS, SAQ, Hip flexion, and Hip ABD AA. vc/tc req, increased time req         Bed Mobility  Bed Mobility: Yes  Bed Mobility 1  Bed Mobility 1: Sitting to supine  Level of Assistance 1: Close supervision  Bed Mobility Comments 1: pt used UE assist to complete slowly with VC for technique    Ambulation/Gait Training  Ambulation/Gait Training Performed: Yes  Ambulation/Gait Training 1  Surface 1: Level tile  Device 1: Rolling walker  Gait Support Devices: Gait belt  Assistance 1: Contact guard, Close supervision  Comments/Distance (ft) 1: 60x2 (pt able to perform wtih decreased step height and length, step through gait pattern.  min VC not to lean on RW during rest break x2.  no overt LOB. pt completed slowly.)  Transfer 1  Technique 1: Sit to stand, Stand to sit  Transfer Device 1: Walker  Transfer Level of Assistance 1: Contact guard, Close supervision  Trials/Comments 1: vc/tc for hand placment on solid sitting surface and not RW. as well as RLE placement.  pt performed x2    Stairs  Stairs: Yes  Stairs  Comment/Number of Steps 1: pt performed 4 steps with 2 rails. VC for BLE sequence.  CGA.  no overt LOB.  pt completed slowly    Outcome Measures:  [facility] Basic Mobility  Turning from your back to your side while in a flat bed without using bedrails: A little  Moving from lying on your back to sitting on the side of a flat bed without using bedrails: A little  Moving to and from bed to chair (including a wheelchair): A little  Standing up from a chair using your arms (e.g. wheelchair or bedside chair): A little  To walk in hospital room: A little  Climbing 3-5 steps with railing: A little  Basic Mobility - Total Score: 18    Education Documentation  Precautions, taught by Otilio Kathleen PTA at 10/8/2024  2:03 PM.  Learner: Patient  Readiness: Acceptance  Method: Explanation  Response: Verbalizes  Understanding    Home Exercise Program, taught by Otilio Kathleen PTA at 10/8/2024  2:03 PM.  Learner: Patient  Readiness: Acceptance  Method: Explanation  Response: Verbalizes Understanding    Mobility Training, taught by Otilio Kathleen PTA at 10/8/2024  2:03 PM.  Learner: Patient  Readiness: Acceptance  Method: Explanation  Response: Verbalizes Understanding    Education Comments  No comments found.        OP EDUCATION:  Outpatient Education  Education Comment: Educated pt on surgical precautions as well as car transfer technique.  pt stated understanding    Encounter Problems       Encounter Problems (Resolved)       PT Problem       Patient will complete bed mobility with HOB flat, Mod I with adherence to precautions  (Adequate for Discharge)       Start:  10/07/24    Expected End:  10/10/24    Resolved:  10/08/24         Patient will complete all transfers with LRAD and mod I, from all surfaces/heights and with adherence to LUIZA precautions.  (Adequate for Discharge)       Start:  10/07/24    Expected End:  10/10/24    Resolved:  10/08/24         Patient will ambulate >250 feet with LRAD and mod I, using reciprocal steady pattern, and adhering to LUIZA precautions.  (Adequate for Discharge)       Start:  10/07/24    Expected End:  10/10/24    Resolved:  10/08/24         Patient will ascend/descend 4 stairs with supervision, bilat HR vs. single HR and LRAD, demonstrating safe technique and adhering to LUIZA precautions  (Adequate for Discharge)       Start:  10/07/24    Expected End:  10/10/24    Resolved:  10/08/24         Patient will demonstrate independence with LUIZA HEP x20 reps with proper sequencing, demonstrating >4/5 strength and independent LLE activation with activity and therex.   (Adequate for Discharge)       Start:  10/07/24    Expected End:  10/10/24    Resolved:  10/08/24

## 2024-10-08 NOTE — NURSING NOTE
Interdisciplinary team present: NP, PT, NM, CC, SW, Orthopedic Coordinator, and bedside RN.  Pain - controlled  Nausea - none  Discharge barrier - n/a  Discharge plan - Home with [facility]  Discharge date/time -10/8/2024

## 2024-10-08 NOTE — PROGRESS NOTES
10/08/24 1004   Discharge Planning   Living Arrangements Spouse/significant other   Support Systems Spouse/significant other;Parent;Family members   Assistance Needed Independent prior to admission   Type of Residence Private residence   Number of Stairs to Enter Residence 4   Number of Stairs Within Residence 12   Who is requesting discharge planning? Patient   Home or Post Acute Services In home services   Type of Home Care Services Home OT;Home PT   Expected Discharge Disposition Home H  ([facility]: PT, OT)   Does the patient need discharge transport arranged? No     PLAN/BARRIER: PT clearance, meds to beds  DISP: home with [facility]  ADOD: today after PT  Brittany Lanier RN

## 2024-10-08 NOTE — NURSING NOTE
Patient's mepilex is saturated, more than half covered. Dr. LOUISA Mcclendon notified and aware. Dr Mahajan will be in to assess the incision and place a new mepilex prior to discharge.

## 2024-10-08 NOTE — HH CARE COORDINATION
Home Care received an Ortho Referral for Physical Therapy and Occupational Therapy. We have processed the referral for a Start of Care on 10.9.2024.     If you have any questions or concerns, please feel free to contact us at [phone]. Follow the prompts, enter your five digit zip code, and you will be directed to your care team on [facility] 3.

## 2024-10-08 NOTE — OP NOTE
"Right Hip Total Arthroplasty ~  Posterior Approach (R) Operative Note     Date: 10/7/2024  OR Location: [facility] A OR    Name: Christiano Gasca \"Armand\", : 1990, Age: 33 y.o., MRN: [id number], Sex: male    Diagnosis  Pre-op Diagnosis      * Hip arthritis [M16.10] Post-op Diagnosis     * Hip arthritis [M16.10]     Procedures  Right Hip Total Arthroplasty ~  Posterior Approach  05984 - NV ARTHRP ACETBLR/PROX FEM PROSTC AGRFT/ALGRFT      Surgeons      * Yazan Caruso - Primary    Resident/Fellow/Other Assistant:  Surgeons and Role:     * Alfred Lindsay MD - Resident - Assisting     * Juventino Mcclendon MD - Resident - Assisting    Procedure Summary  Anesthesia: Spinal  ASA: II  Anesthesia Staff: Anesthesiologist: Phil Lane MD  C-AA: HAL Chavarria; HAL Cha  SHERWIN: Mirta Sathasivam  Estimated Blood Loss: 150mL  Intra-op Medications:   Administrations occurring from 0900 to 1130 on 10/07/24:   Medication Name Total Dose   sodium chloride 0.9 % irrigation solution 3,000 mL   sterile water irrigation solution 1,000 mL              Anesthesia Record               Intraprocedure I/O Totals          Intake    Tranexamic Acid 10.00 mL    The total shown is the total volume documented since Anesthesia Start was filed.    LR infusion 1200.00 mL    albumin human 5 % 250.00 mL    Total Intake 1460 mL       Output    Est. Blood Loss 200 mL    Total Output 200 mL       Net    Net Volume 1260 mL          Specimen: No specimens collected     Staff:   Circulator: Yoly  Scrub Person: Ino  Scrub Person: Red  Circulator: Arlet Frank Circulator: Derik Frank Scrub: Farnaz         Drains and/or Catheters: * None in log *    Tourniquet Times:         Implants:  Implants       Type Name Action Serial No.      Joint Hip ACETABULAR CUP, SECTOR, GRIPTON, SIZE 58MM - SN/A - [id number] Implanted N/A     Screw SCREW CANCELLOUS 6.5 X 25 - [id number] Implanted      Joint Hip LINER, ALTRX, +4, 10 DEGREE, 36 X 58MM - " [id number] Implanted      Joint Hip HIP STEM TAPER 6 STD OFFSET - SN/A - [id number] Implanted N/A     Joint Hip FEMORAL HEAD, CERAMIC 36 +1.5 - SN/A - [id number] Implanted N/A              Findings: djd    Indications: Armand Gasca is an 33 y.o. male who is having surgery for Hip arthritis [M16.10].     The patient was seen in the preoperative area. The risks, benefits, complications, treatment options, non-operative alternatives, expected recovery and outcomes were discussed with the patient. The possibilities of reaction to medication, pulmonary aspiration, injury to surrounding structures, bleeding, recurrent infection, the need for additional procedures, failure to diagnose a condition, and creating a complication requiring transfusion or operation were discussed with the patient. The patient concurred with the proposed plan, giving informed consent.  The site of surgery was properly noted/marked if necessary per policy. The patient has been actively warmed in preoperative area. Preoperative antibiotics have been ordered and given within 1 hours of incision. Venous thrombosis prophylaxis have been ordered including unilateral sequential compression device    Procedure Details: Preoperative huddle was performed the patient edification procedure and site verification.  Patient given prophylactic antibiotics and TXA.  After placement of spinal anesthetic augmented by IV sedation the patient was positioned in lateral decubitus position.  Next the right hip was sterilely prepped draped usual fashion.  Standard posterior approach is made to the hip short rotators and capsule taken down to 1 layer and teed proximally.  Hip was dislocated.  The femoral neck cut was made using neck cutting device.  Deep acetabular tractors were placed.  Labrum was excised.  Pulmonary was excised.  The asked to have a sense stepwise reamed in the usual fashion ultimately was reamed to 57 mm.  Next a 58 mm [place] sector GRIPTION cup was  placed had good interference fit.  1 screw was used for additional fixation.  The +410 degree extended liner was then placed in the 10 o'clock position.  Next attention was paid to the femur.  The femur was ultimately broached and reamed to accept a size 6 Butts standard offset stem had good torsional stability standard neck length was utilized with a 30 to 36 mm ceramic head.  This is a stable construct.  Leg lengths appeared equal.  Interoperative x-ray was obtained.  The wound was then irrigated and closed in layers it was dry prior to closure.  Short rotators and capsule reattached the trochanter through drill holes.  Sterile dressing applied.  Complications:  None; patient tolerated the procedure well.    Disposition: PACU - hemodynamically stable.  Condition: stable         Additional Details:     Attending Attestation: I was present for the entire procedure.    Yazan Caruso  Phone Number: [phone]

## 2024-10-09 ENCOUNTER — HOME CARE VISIT (OUTPATIENT)
Dept: HOME HEALTH SERVICES | Facility: HOME HEALTH | Age: 34
End: 2024-10-09
Payer: COMMERCIAL

## 2024-10-09 PROCEDURE — G0151 HHCP-SERV OF PT,EA 15 MIN: HCPCS

## 2024-10-09 PROCEDURE — G0152 HHCP-SERV OF OT,EA 15 MIN: HCPCS

## 2024-10-09 ASSESSMENT — ENCOUNTER SYMPTOMS
PAIN: 1
PAIN SEVERITY GOAL: 0/10
LOWEST PAIN SEVERITY IN PAST 24 HOURS: 3/10
HIGHEST PAIN SEVERITY IN PAST 24 HOURS: 10/10
PAIN LOCATION: RIGHT HIP
PAIN LOCATION - PAIN SEVERITY: 3/10
PERSON REPORTING PAIN: PATIENT
SUBJECTIVE PAIN PROGRESSION: WAXING AND WANING

## 2024-10-09 ASSESSMENT — ACTIVITIES OF DAILY LIVING (ADL)
BATHING ASSESSED: 1
FEEDING: INDEPENDENT
PHYSICAL TRANSFERS ASSESSED: 1
TOILETING: MINIMUM ASSIST
DRESSING_UB_CURRENT_FUNCTION: STAND BY ASSIST
SHOPPING ASSESSED: 1
BATHING_CURRENT_FUNCTION: MODERATE ASSIST
TRANSPORTATION: DEPENDENT
TRANSPORTATION ASSESSED: 1
DRESSING_LB_CURRENT_FUNCTION: MODERATE ASSIST
CURRENT_FUNCTION: CONTACT GUARD ASSIST
LAUNDRY ASSESSED: 1
TELEPHONE USE ASSESSED: 1
HOUSEKEEPING ASSESSED: 1
PREPARING MEALS: NEEDS ASSISTANCE
ORAL_CARE_CURRENT_FUNCTION: INDEPENDENT
SHOPPING: DEPENDENT
LAUNDRY: DEPENDENT
GROOMING_CURRENT_FUNCTION: INDEPENDENT
GROOMING ASSESSED: 1
USING THE TELPHONE: INDEPENDENT
ORAL_CARE_ASSESSED: 1
LIGHT HOUSEKEEPING: DEPENDENT
FEEDING ASSESSED: 1
TOILETING: 1

## 2024-10-10 ENCOUNTER — TELEPHONE (OUTPATIENT)
Dept: ORTHOPEDIC SURGERY | Facility: HOSPITAL | Age: 34
End: 2024-10-10
Payer: COMMERCIAL

## 2024-10-10 NOTE — TELEPHONE ENCOUNTER
Spoke with patient during follow-up phone call.  Patient indicates that they are doing well and pain is under control.  Patient denies questions related to discharge instructions or homegoing medications.  Patient is aware to call the office to set up a  follow up visit with surgeon's office for 2 weeks post-op.  Home Care has established a first visit with patient.   He has not had a bowel movement since surgery. He is taking the prescribed colace and miralax with no results. He was encouraged to try a suppository and continue the colace and Miralax while taking the pain medications.  Patient denies addtional questions or concerns at this time and verbalizes understanding to call RN Navigator or Surgeon's office with any new or worsening symptoms.

## 2024-10-11 VITALS
HEART RATE: 76 BPM | OXYGEN SATURATION: 96 % | DIASTOLIC BLOOD PRESSURE: 70 MMHG | SYSTOLIC BLOOD PRESSURE: 112 MMHG | TEMPERATURE: 97.9 F

## 2024-10-11 ASSESSMENT — ENCOUNTER SYMPTOMS
MUSCLE WEAKNESS: 1
PERSON REPORTING PAIN: PATIENT
PAIN SEVERITY GOAL: 3/10
HIGHEST PAIN SEVERITY IN PAST 24 HOURS: 7/10
SUBJECTIVE PAIN PROGRESSION: GRADUALLY IMPROVING
LOWEST PAIN SEVERITY IN PAST 24 HOURS: 5/10
PAIN: 1

## 2024-10-11 ASSESSMENT — ACTIVITIES OF DAILY LIVING (ADL)
ENTERING_EXITING_HOME: STAND BY ASSIST
OASIS_M1830: 03
AMBULATION ASSISTANCE: STAND BY ASSIST
AMBULATION ASSISTANCE: 1
AMBULATION ASSISTANCE ON FLAT SURFACES: 1

## 2024-10-14 ENCOUNTER — HOME CARE VISIT (OUTPATIENT)
Dept: HOME HEALTH SERVICES | Facility: HOME HEALTH | Age: 34
End: 2024-10-14
Payer: COMMERCIAL

## 2024-10-14 PROCEDURE — G0157 HHC PT ASSISTANT EA 15: HCPCS | Mod: CQ

## 2024-10-14 SDOH — HEALTH STABILITY: PHYSICAL HEALTH: EXERCISE TYPE: RLE STANDING

## 2024-10-14 SDOH — HEALTH STABILITY: PHYSICAL HEALTH
EXERCISE COMMENTS: PT COMPLETED BLE STANDING: 1 X15 REPS  MARCHES  HIP ABD  HIP EXT  HEEL RAISES   HAMSTRING CURLS  MINI SQUATS

## 2024-10-14 ASSESSMENT — ENCOUNTER SYMPTOMS
PERSON REPORTING PAIN: PATIENT
PAIN LOCATION - PAIN QUALITY: INTENSE ACHE
SUBJECTIVE PAIN PROGRESSION: GRADUALLY IMPROVING
LIMITED RANGE OF MOTION: 1
PAIN: 1
HIGHEST PAIN SEVERITY IN PAST 24 HOURS: 6/10
PAIN LOCATION: RIGHT HIP
LOWEST PAIN SEVERITY IN PAST 24 HOURS: 4/10
PAIN LOCATION - PAIN SEVERITY: 6/10
PAIN LOCATION - PAIN FREQUENCY: WITH ACTIVITY
MUSCLE WEAKNESS: 1

## 2024-10-14 ASSESSMENT — ACTIVITIES OF DAILY LIVING (ADL)
CURRENT_FUNCTION: STAND BY ASSIST
AMBULATION_DISTANCE/DURATION_TOLERATED: 20 FT
AMBULATION ASSISTANCE ON FLAT SURFACES: 1
AMBULATION ASSISTANCE: STAND BY ASSIST

## 2024-10-15 ENCOUNTER — TELEPHONE (OUTPATIENT)
Dept: ORTHOPEDIC SURGERY | Facility: CLINIC | Age: 34
End: 2024-10-15
Payer: COMMERCIAL

## 2024-10-15 ENCOUNTER — HOME CARE VISIT (OUTPATIENT)
Dept: HOME HEALTH SERVICES | Facility: HOME HEALTH | Age: 34
End: 2024-10-15
Payer: COMMERCIAL

## 2024-10-15 DIAGNOSIS — M16.10 HIP ARTHRITIS: Primary | ICD-10-CM

## 2024-10-15 PROCEDURE — G0152 HHCP-SERV OF OT,EA 15 MIN: HCPCS

## 2024-10-15 RX ORDER — OXYCODONE HYDROCHLORIDE 5 MG/1
5 TABLET ORAL EVERY 6 HOURS PRN
Qty: 28 TABLET | Refills: 0 | Status: SHIPPED | OUTPATIENT
Start: 2024-10-15 | End: 2024-10-22

## 2024-10-15 ASSESSMENT — ACTIVITIES OF DAILY LIVING (ADL)
DRESSING_LB_CURRENT_FUNCTION: STAND BY ASSIST
PREPARING MEALS: NEEDS ASSISTANCE
DRESSING_UB_CURRENT_FUNCTION: STAND BY ASSIST
BATHING_CURRENT_FUNCTION: MINIMUM ASSIST
ORAL_CARE_ASSESSED: 1
TELEPHONE USE ASSESSED: 1
GROOMING_CURRENT_FUNCTION: INDEPENDENT
LIGHT HOUSEKEEPING: DEPENDENT
SHOPPING: DEPENDENT
GROOMING ASSESSED: 1
ORAL_CARE_CURRENT_FUNCTION: INDEPENDENT
TRANSPORTATION: DEPENDENT
USING THE TELPHONE: INDEPENDENT
PHYSICAL TRANSFERS ASSESSED: 1
LAUNDRY: DEPENDENT
LAUNDRY ASSESSED: 1
BATHING ASSESSED: 1
HOUSEKEEPING ASSESSED: 1
TOILETING: SUPERVISION
FEEDING: INDEPENDENT
FEEDING ASSESSED: 1
SHOPPING ASSESSED: 1
TRANSPORTATION ASSESSED: 1
TOILETING: 1
CURRENT_FUNCTION: CONTACT GUARD ASSIST

## 2024-10-15 ASSESSMENT — ENCOUNTER SYMPTOMS
PAIN LOCATION: RIGHT HIP
PAIN: 1
PAIN SEVERITY GOAL: 0/10
PAIN LOCATION - PAIN SEVERITY: 8/10
SUBJECTIVE PAIN PROGRESSION: WAXING AND WANING
PERSON REPORTING PAIN: PATIENT
HIGHEST PAIN SEVERITY IN PAST 24 HOURS: 10/10
PAIN LOCATION - PAIN QUALITY: ACHE
LOWEST PAIN SEVERITY IN PAST 24 HOURS: 7/10

## 2024-10-21 ENCOUNTER — HOME CARE VISIT (OUTPATIENT)
Dept: HOME HEALTH SERVICES | Facility: HOME HEALTH | Age: 34
End: 2024-10-21
Payer: COMMERCIAL

## 2024-10-21 VITALS
TEMPERATURE: 97.6 F | SYSTOLIC BLOOD PRESSURE: 128 MMHG | OXYGEN SATURATION: 96 % | HEART RATE: 84 BPM | DIASTOLIC BLOOD PRESSURE: 70 MMHG

## 2024-10-21 PROCEDURE — G0151 HHCP-SERV OF PT,EA 15 MIN: HCPCS

## 2024-10-21 ASSESSMENT — ENCOUNTER SYMPTOMS
PAIN: 1
LOWEST PAIN SEVERITY IN PAST 24 HOURS: 2/10
PERSON REPORTING PAIN: PATIENT
MUSCLE WEAKNESS: 1
SUBJECTIVE PAIN PROGRESSION: GRADUALLY IMPROVING
PAIN SEVERITY GOAL: 3/10
HIGHEST PAIN SEVERITY IN PAST 24 HOURS: 2/10
LIMITED RANGE OF MOTION: 1

## 2024-10-21 ASSESSMENT — PAIN SCALES - PAIN ASSESSMENT IN ADVANCED DEMENTIA (PAINAD)
BREATHING: 0
BODYLANGUAGE: 0
TOTALSCORE: 0
NEGVOCALIZATION: 0
CONSOLABILITY: 0
FACIALEXPRESSION: 0 - SMILING OR INEXPRESSIVE.
NEGVOCALIZATION: 0 - NONE.
FACIALEXPRESSION: 0
CONSOLABILITY: 0 - NO NEED TO CONSOLE.
BODYLANGUAGE: 0 - RELAXED.

## 2024-10-21 ASSESSMENT — ACTIVITIES OF DAILY LIVING (ADL)
HOME_HEALTH_OASIS: 00
OASIS_M1830: 00

## 2024-10-24 ENCOUNTER — HOSPITAL ENCOUNTER (OUTPATIENT)
Dept: RADIOLOGY | Facility: CLINIC | Age: 34
Discharge: HOME | End: 2024-10-24
Payer: COMMERCIAL

## 2024-10-24 ENCOUNTER — APPOINTMENT (OUTPATIENT)
Dept: ORTHOPEDIC SURGERY | Facility: CLINIC | Age: 34
End: 2024-10-24
Payer: COMMERCIAL

## 2024-10-24 DIAGNOSIS — Z96.641 S/P TOTAL RIGHT HIP ARTHROPLASTY: ICD-10-CM

## 2024-10-24 PROCEDURE — 99024 POSTOP FOLLOW-UP VISIT: CPT | Performed by: ORTHOPAEDIC SURGERY

## 2024-10-24 PROCEDURE — 73502 X-RAY EXAM HIP UNI 2-3 VIEWS: CPT | Mod: RT

## 2024-10-24 PROCEDURE — 4004F PT TOBACCO SCREEN RCVD TLK: CPT | Performed by: ORTHOPAEDIC SURGERY

## 2024-10-24 PROCEDURE — 73502 X-RAY EXAM HIP UNI 2-3 VIEWS: CPT | Mod: RIGHT SIDE | Performed by: RADIOLOGY

## 2024-10-24 ASSESSMENT — PAIN DESCRIPTION - DESCRIPTORS: DESCRIPTORS: ACHING;TIGHTNESS

## 2024-10-24 ASSESSMENT — PAIN - FUNCTIONAL ASSESSMENT: PAIN_FUNCTIONAL_ASSESSMENT: 0-10

## 2024-10-24 ASSESSMENT — PAIN SCALES - GENERAL: PAINLEVEL_OUTOF10: 6

## 2024-10-24 NOTE — PROGRESS NOTES
2 weeks status post right total hip arthroplasty feels well walking with a cane he is finished inpatient physical therapy wants to return to work on a limited basis  Examination fairly steady gait with a cane incisions healing nicely x-rays are reviewed show implant in good position assessment doing well reviewed precautions follow-up 6 weeks outpatient physical therapy note for work

## 2024-10-24 NOTE — LETTER
October 24, 2024     Patient: Christiano Gasca   YOB: 1990   Date of Visit: 10/24/2024       To Whom It May Concern:    It is my medical opinion that Christiano Gasca  may return to work on 10/28/24 with the following restrictions: no lifting over 50 pounds, no bending, no twisting, no driving and no jumping over the truck .    If you have any questions or concerns, please don't hesitate to call [phone].         Sincerely,        Yazan Caruso MD

## 2024-12-09 ENCOUNTER — OFFICE VISIT (OUTPATIENT)
Dept: ORTHOPEDIC SURGERY | Facility: CLINIC | Age: 34
End: 2024-12-09
Payer: COMMERCIAL

## 2024-12-09 DIAGNOSIS — Z96.641 S/P TOTAL RIGHT HIP ARTHROPLASTY: Primary | ICD-10-CM

## 2024-12-09 PROCEDURE — 4004F PT TOBACCO SCREEN RCVD TLK: CPT | Performed by: ORTHOPAEDIC SURGERY

## 2024-12-09 PROCEDURE — 99211 OFF/OP EST MAY X REQ PHY/QHP: CPT | Performed by: ORTHOPAEDIC SURGERY

## 2024-12-09 NOTE — PROGRESS NOTES
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

## 2024-12-09 NOTE — LETTER
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    If you have any questions or concerns, please don't hesitate to call [phone]         Sincerely,        MD Xu Jane MA

## 2025-03-13 ENCOUNTER — APPOINTMENT (OUTPATIENT)
Dept: PRIMARY CARE | Facility: CLINIC | Age: 35
End: 2025-03-13
Payer: COMMERCIAL

## 2025-03-13 ENCOUNTER — APPOINTMENT (OUTPATIENT)
Dept: LAB | Facility: HOSPITAL | Age: 35
End: 2025-03-13
Payer: COMMERCIAL

## 2025-03-13 ENCOUNTER — HOSPITAL ENCOUNTER (OUTPATIENT)
Dept: RADIOLOGY | Facility: CLINIC | Age: 35
Discharge: HOME | End: 2025-03-13
Payer: COMMERCIAL

## 2025-03-13 VITALS
SYSTOLIC BLOOD PRESSURE: 155 MMHG | DIASTOLIC BLOOD PRESSURE: 94 MMHG | HEART RATE: 76 BPM | BODY MASS INDEX: 32.23 KG/M2 | HEIGHT: 72 IN | OXYGEN SATURATION: 100 % | WEIGHT: 238 LBS

## 2025-03-13 DIAGNOSIS — M25.551 PAIN OF RIGHT HIP: ICD-10-CM

## 2025-03-13 DIAGNOSIS — K86.89 FATTY PANCREAS (HHS-HCC): Primary | ICD-10-CM

## 2025-03-13 DIAGNOSIS — R03.0 ELEVATED BLOOD PRESSURE READING: ICD-10-CM

## 2025-03-13 DIAGNOSIS — R74.8 ELEVATED LIVER ENZYMES: ICD-10-CM

## 2025-03-13 PROCEDURE — 99214 OFFICE O/P EST MOD 30 MIN: CPT | Performed by: INTERNAL MEDICINE

## 2025-03-13 PROCEDURE — 1036F TOBACCO NON-USER: CPT | Performed by: INTERNAL MEDICINE

## 2025-03-13 PROCEDURE — 73502 X-RAY EXAM HIP UNI 2-3 VIEWS: CPT | Mod: RT

## 2025-03-13 PROCEDURE — 86804 HEP C AB TEST CONFIRM: CPT

## 2025-03-13 PROCEDURE — G8433 SCR FOR DEP NOT CPT DOC RSN: HCPCS | Performed by: INTERNAL MEDICINE

## 2025-03-13 PROCEDURE — 3008F BODY MASS INDEX DOCD: CPT | Performed by: INTERNAL MEDICINE

## 2025-03-13 ASSESSMENT — PROMIS GLOBAL HEALTH SCALE
RATE_AVERAGE_FATIGUE: MILD
RATE_SOCIAL_SATISFACTION: VERY GOOD
RATE_GENERAL_HEALTH: VERY GOOD
RATE_PHYSICAL_HEALTH: GOOD
EMOTIONAL_PROBLEMS: SOMETIMES
RATE_MENTAL_HEALTH: FAIR
RATE_QUALITY_OF_LIFE: VERY GOOD
CARRYOUT_PHYSICAL_ACTIVITIES: MODERATELY
CARRYOUT_SOCIAL_ACTIVITIES: FAIR
RATE_AVERAGE_PAIN: 5

## 2025-03-13 NOTE — PROGRESS NOTES
"Subjective   Patient ID: Armand Gasca is a 34 y.o. male who presents for Annual Exam.    HPI     Patient is a 34-year-old male who presents as follow-up     Last visit was discussed going to hepatologist given frequent elevations in the liver enzymes however never made the appointment.  Denies any right upper quadrant abdominal pain.  Did not complete his hepatic function panel ordered last visit.  He denies use of Tylenol or alcohol use.     Blood pressure today is significantly elevated.  Patient attributes it to the red bull that he drank this morning.  No headache changes in vision orthopnea no history of hypertension    He had a recent hip replacement surgery.  He states he did fall on it 2 months ago and he continues to have pain in the right hip.  He is interested in a x-ray to ensure that the screws are in place.          Review of Systems  Constitutional: No fever or chills  Cardiovascular: no chest pain, no palpitations and no syncope.   Respiratory: no cough, no shortness of breath during exertion and no shortness of breath at rest.   Gastrointestinal: no abdominal pain, no nausea and no vomiting.  Neuro: No Headache, no dizziness    Objective   BP (!) 155/94   Pulse 76   Ht 1.83 m (6' 0.05\")   Wt 108 kg (238 lb)   SpO2 100%   BMI 32.23 kg/m²     Physical Exam  Constitutional: Alert and in no acute distress. Well developed, well nourished  Head and Face: Head and face: Normal.    Cardiovascular: Heart rate and rhythm were normal, normal S1 and S2. No peripheral edema.   Pulmonary: No respiratory distress. Clear bilateral breath sounds.  Musculoskeletal: Gait and station: Normal. Muscle strength/tone: Normal.   Skin: Normal skin color and pigmentation, normal skin turgor, and no rash.    Psychiatric: Judgment and insight: Intact. Mood and affect: Normal.        Lab Results   Component Value Date    WBC 7.4 10/08/2024    HGB 10.5 (L) 10/08/2024    HCT 31.9 (L) 10/08/2024     10/08/2024    CHOL " 109 05/04/2023    TRIG 47 05/04/2023    HDL 46.4 05/04/2023     (H) 05/04/2023    AST 57 (H) 05/04/2023     (L) 10/08/2024    K 3.6 10/08/2024     10/08/2024    CREATININE 0.75 10/08/2024    BUN 11 10/08/2024    CO2 30 10/08/2024    HGBA1C 5.4 09/04/2024       XR hip right with pelvis when performed 2 or 3 views  Narrative: Interpreted By:  Paola Vyas,   STUDY:  Single view pelvis.  Two views right hip.      INDICATION:  Signs/Symptoms:POST OP      COMPARISON:  09/04/2024.      ACCESSION NUMBER(S):  [id number]      ORDERING CLINICIAN:  FARZANEH CULVER      FINDINGS:  No acute fracture or malalignment.  Patient is status post right total hip arthroplasty. No perihardware  fractures or lucencies. Alignment is anatomic. Left hip demonstrates  mild degenerative changes.      Impression: 1.  Right total hip arthroplasty without hardware complication.      MACRO:  None.      Signed by: Paola Vyas 10/26/2024 6:21 AM  Dictation workstation:   [id number]            Assessment/Plan   Problem List Items Addressed This Visit       Fatty pancreas (HHS-HCC) - Primary    Relevant Orders    Hepatic Function Panel    Protime-INR    Hepatitis A Antibody, Total    Hepatitis B Surface Antibody    Hepatitis B Core Antibody, Total    Donor Hepatitis C Antibody    Iron and TIBC    Ferritin    Ceruloplasmin    LISSETT with Reflex to THO    US abdomen limited liver    Follow Up In [facility] - PCP - Established     Other Visit Diagnoses       Elevated liver enzymes        Relevant Orders    Hepatic Function Panel    Protime-INR    Hepatitis A Antibody, Total    Hepatitis B Surface Antibody    Hepatitis B Core Antibody, Total    Donor Hepatitis C Antibody    Iron and TIBC    Ferritin    Ceruloplasmin    LISSETT with Reflex to THO    US abdomen limited liver    Follow Up In [facility] - PCP - Established    Pain of right hip        Relevant Orders    XR hip right with pelvis when performed 2 or  3 views    Elevated blood pressure reading        Relevant Orders    Follow Up In [facility] - PCP - Established          Will complete workup for elevated liver enzymes at this time.  See orders above.  Consider referral to hepatology once again pending laboratory test results.    Blood pressure uncontrolled.  First time it has been elevated.  Will monitor at this time and follow-up 3 months.  Advised ambulatory blood pressure readings and calling if blood pressure consistently above 130 systolic.     Check imaging of the hip

## 2025-03-14 LAB
ALBUMIN SERPL-MCNC: 4.9 G/DL (ref 3.6–5.1)
ALBUMIN/GLOB SERPL: 2.2 (CALC) (ref 1–2.5)
ALP SERPL-CCNC: 72 U/L (ref 36–130)
ALT SERPL-CCNC: 49 U/L (ref 9–46)
ANA SER QL IF: NEGATIVE
AST SERPL-CCNC: 23 U/L (ref 10–40)
BILIRUB DIRECT SERPL-MCNC: 0.2 MG/DL
BILIRUB INDIRECT SERPL-MCNC: 0.6 MG/DL (CALC) (ref 0.2–1.2)
BILIRUB SERPL-MCNC: 0.8 MG/DL (ref 0.2–1.2)
CERULOPLASMIN SERPL-MCNC: 22 MG/DL (ref 14–30)
FERRITIN SERPL-MCNC: 83 NG/ML (ref 38–380)
GLOBULIN SER CALC-MCNC: 2.2 G/DL (CALC) (ref 1.9–3.7)
HAV AB SER QL IA: NORMAL
HBV CORE AB SERPL QL IA: NORMAL
HBV SURFACE AB SERPL IA-ACNC: >1000 MIU/ML
INR PPP: 1
IRON SATN MFR SERPL: 22 % (CALC) (ref 20–48)
IRON SERPL-MCNC: 75 MCG/DL (ref 50–180)
PROT SERPL-MCNC: 7.1 G/DL (ref 6.1–8.1)
PROTHROMBIN TIME: 10.8 SEC (ref 9–11.5)
TIBC SERPL-MCNC: 343 MCG/DL (CALC) (ref 250–425)

## 2025-03-16 LAB — HCV AB SER DONR QL IA: NEGATIVE

## 2025-03-21 ENCOUNTER — HOSPITAL ENCOUNTER (OUTPATIENT)
Dept: RADIOLOGY | Facility: CLINIC | Age: 35
Discharge: HOME | End: 2025-03-21
Payer: COMMERCIAL

## 2025-03-21 DIAGNOSIS — K86.89 FATTY PANCREAS (HHS-HCC): ICD-10-CM

## 2025-03-21 DIAGNOSIS — R74.8 ELEVATED LIVER ENZYMES: ICD-10-CM

## 2025-03-21 PROCEDURE — 76705 ECHO EXAM OF ABDOMEN: CPT

## 2025-06-13 ENCOUNTER — APPOINTMENT (OUTPATIENT)
Dept: PRIMARY CARE | Facility: CLINIC | Age: 35
End: 2025-06-13
Payer: COMMERCIAL

## 2025-06-13 VITALS
DIASTOLIC BLOOD PRESSURE: 86 MMHG | HEART RATE: 64 BPM | SYSTOLIC BLOOD PRESSURE: 130 MMHG | BODY MASS INDEX: 31.15 KG/M2 | WEIGHT: 230 LBS

## 2025-06-13 DIAGNOSIS — R74.8 ELEVATED LIVER ENZYMES: ICD-10-CM

## 2025-06-13 DIAGNOSIS — Z00.00 HEALTH CARE MAINTENANCE: ICD-10-CM

## 2025-06-13 DIAGNOSIS — M16.11 ARTHRITIS OF RIGHT HIP: ICD-10-CM

## 2025-06-13 DIAGNOSIS — M77.30 CALCANEAL SPUR, UNSPECIFIED LATERALITY: Primary | ICD-10-CM

## 2025-06-13 DIAGNOSIS — R03.0 ELEVATED BLOOD PRESSURE READING: ICD-10-CM

## 2025-06-13 DIAGNOSIS — K86.89 FATTY PANCREAS (HHS-HCC): ICD-10-CM

## 2025-06-13 PROCEDURE — 1036F TOBACCO NON-USER: CPT | Performed by: INTERNAL MEDICINE

## 2025-06-13 PROCEDURE — 99213 OFFICE O/P EST LOW 20 MIN: CPT | Performed by: INTERNAL MEDICINE

## 2025-06-13 NOTE — LETTER
June 13, 2025     Patient: Christiano Gasca   YOB: 1990   Date of Visit: 6/13/2025       To Whom It May Concern:    Christiano Gasca was seen in my clinic on 6/13/2025 at 10:00 am. Patient is cleared to work without restrictions.     If you have any questions or concerns, please don't hesitate to call.         Sincerely,         Jared Laurent DO        CC: No Recipients

## 2025-06-13 NOTE — PATIENT INSTRUCTIONS
We kindly ask that you take the lead and scheduling your referral appointments to ensure they align best with your availability by calling [phone].  For laboratory tests we encourage you to schedule an appointment online [email or web address] but walk-ins are available as well.  For radiology testing you can call [phone] or [phone] to schedule.  If for any reason you are having difficulty scheduling your appointments please feel free to reach out at our office by calling [phone] to assist further

## 2025-06-13 NOTE — PROGRESS NOTES
"Subjective   Patient ID: Christiano Gasca \"Armand\" is a 34 y.o. male who presents for Follow-up.    HPI     Patient is a 34-year-old male with past history of arthritis of the hip status post Right Hip Total Arthroplasty on October 7, 2024 he is requesting a work slip to return back to work without restrictions.  Seems she has been on restrictions but was never taken off the restrictions.  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   Respiratory: no cough, no shortness of breath during exertion and no shortness of breath at rest.   Gastrointestinal: no abdominal pain, no nausea and no vomiting.  Neuro: No Headache, no dizziness    Objective   /86   Pulse 64   Wt 104 kg (230 lb)   BMI 31.15 kg/m²     Physical Exam  Constitutional: Alert and in no acute distress. Well developed, well nourished  Head and Face: Head and face: Normal.    Cardiovascular: Heart rate and rhythm were normal, normal S1 and S2. No peripheral edema.   Pulmonary: No respiratory distress. Clear bilateral breath sounds.  Musculoskeletal: Gait and station: Normal. Muscle strength/tone: Normal.   Skin: Normal skin color and pigmentation, normal skin turgor, and no rash.    Psychiatric: Judgment and insight: Intact. Mood and affect: Normal.    Procedures    Lab Results   Component Value Date    WBC 7.4 10/08/2024    HGB 10.5 (L) 10/08/2024    HCT 31.9 (L) 10/08/2024     10/08/2024    CHOL 109 05/04/2023    TRIG 47 05/04/2023    HDL 46.4 05/04/2023    ALT 49 (H) 03/13/2025    AST 23 03/13/2025     (L) 10/08/2024    K 3.6 10/08/2024     10/08/2024    CREATININE 0.75 10/08/2024    BUN 11 10/08/2024    CO2 30 10/08/2024    INR 1.0 03/13/2025    HGBA1C 5.4 09/04/2024       US abdomen limited liver  Narrative: Interpreted By:  Fransisco Ortega,   STUDY:  US ABDOMEN LIMITED LIVER;  3/21/2025 2:39 pm    "   INDICATION:  Signs/Symptoms:TRANSAMINITIS.      ,K86.89 Other specified diseases of pancreas,R74.8 Abnormal levels of  other serum enzymes      COMPARISON:  None.      ACCESSION NUMBER(S):  [id number]      ORDERING CLINICIAN:  RAZA LLANOS      TECHNIQUE:  Multiple images of the right upper quadrant were obtained.      FINDINGS:  LIVER:  The liver measures 13.58 cm and is grossly unremarkable and free of  any focal lesions.          GALLBLADDER:  The gallbladder is nondistended, and demonstrates no evidence of  gallstones, wall thickening or surrounding fluid. The gallbladder  wall thickness is .15 cm. Sonographic Dunn's sign is negative.          BILE DUCTS:  No evidence of intra or extrahepatic biliary dilatation is  identified; the common bile duct measures .33 cm.      PANCREAS:  The pancreas is poorly visualized due to overlying bowel gas.      RIGHT KIDNEY:  The right kidney measures 13.32 cm in length. The renal cortical  echogenicity and thickness are within normal limit.  No  hydronephrosis or renal calculi are seen.      Impression: Poorly visualized pancreas.      Otherwise, grossly unremarkable right upper quadrant ultrasound.      MACRO:  None      Signed by: Fransisco Ortega 3/22/2025 7:26 AM  Dictation workstation:   [id number]            Assessment/Plan   Problem List Items Addressed This Visit           ICD-10-CM    Arthritis, hip M16.10    At this point the surgery was in October 2024 and no further restrictions are required.  Patient may return to work without restrictions         Fatty pancreas (HHS-HCC) K86.89     Other Visit Diagnoses         Codes      Calcaneal spur, unspecified laterality    -  Primary M77.30    Relevant Orders    Referral to Podiatry      Elevated liver enzymes     R74.8      Elevated blood pressure reading     R03.0      Health care maintenance     Z00.00    Relevant Orders    Referral to Ophthalmology

## 2025-06-13 NOTE — ASSESSMENT & PLAN NOTE
At this point the surgery was in October 2024 and no further restrictions are required.  Patient may return to work without restrictions

## 2025-06-20 ENCOUNTER — APPOINTMENT (OUTPATIENT)
Dept: PODIATRY | Facility: CLINIC | Age: 35
End: 2025-06-20
Payer: COMMERCIAL

## 2025-08-13 ENCOUNTER — APPOINTMENT (OUTPATIENT)
Dept: OPHTHALMOLOGY | Facility: CLINIC | Age: 35
End: 2025-08-13
Payer: COMMERCIAL

## 2025-12-15 ENCOUNTER — APPOINTMENT (OUTPATIENT)
Dept: PRIMARY CARE | Facility: CLINIC | Age: 35
End: 2025-12-15
Payer: COMMERCIAL

## (undated) DEVICE — HOOD, SURGICAL, FLYTE SURGICOOL

## (undated) DEVICE — SUTURE, ETHIBOND XTRA, 5 V-37, GRN/BR, LF

## (undated) DEVICE — SUTURE, VICRYL, 1, 27 IN, CT-1, VIOLET

## (undated) DEVICE — INTERPULSE HANDPIECE SET W/ 10FT SUCTION TUBING

## (undated) DEVICE — DRESSING, MEPILEX BORDER, POST-OP AG, 4 X 12 IN

## (undated) DEVICE — DRAPE, SHEET, THREE QUARTER, FAN FOLD, 57 X 77 IN

## (undated) DEVICE — GLOVE, SURGICAL, PROTEXIS PI MICRO, 8.0, PF, LF

## (undated) DEVICE — SOLUTION, IRRIGATION, STERILE WATER, 1000 ML, POUR BOTTLE

## (undated) DEVICE — SUTURE, MONOCRYL, 3-0, 18 IN, PS2, UNDYED

## (undated) DEVICE — GLOVE, SURGICAL, PROTEXIS PI ORTHO, 8.0, PF, LF

## (undated) DEVICE — CLOSURE SYSTEM, DERMABOND, PRINEO, 22CM, STERILE

## (undated) DEVICE — SUTURE, V-LOC, 2-0, 12IN, GS-21, GR 180 ABS

## (undated) DEVICE — SUTURE, V-LOC, 3-0, 18IN, P-12

## (undated) DEVICE — TOWEL, SURGICAL, NEURO, O/R, 16 X 26, BLUE, STERILE

## (undated) DEVICE — SOLUTION, IRRIGATION, SODIUM CHLORIDE 0.9%, 1000 ML, POUR BOTTLE

## (undated) DEVICE — SUTURE, VICRYL, 0, 36 IN, CT-1, UNDYED

## (undated) DEVICE — SUTURE, VICRYL PLUS, 2-0, 27IN, PSL, UND, BRAIDED

## (undated) DEVICE — BLADE, SAW PFC DUAL CUT 25 X 90

## (undated) DEVICE — Device